# Patient Record
Sex: FEMALE | NOT HISPANIC OR LATINO | Employment: OTHER | URBAN - METROPOLITAN AREA
[De-identification: names, ages, dates, MRNs, and addresses within clinical notes are randomized per-mention and may not be internally consistent; named-entity substitution may affect disease eponyms.]

---

## 2017-02-03 ENCOUNTER — HOSPITAL ENCOUNTER (OUTPATIENT)
Dept: RADIOLOGY | Facility: MEDICAL CENTER | Age: 62
End: 2017-02-03

## 2017-02-06 ENCOUNTER — HOSPITAL ENCOUNTER (OUTPATIENT)
Dept: RADIATION ONCOLOGY | Facility: MEDICAL CENTER | Age: 62
End: 2017-02-28
Attending: RADIOLOGY
Payer: OTHER GOVERNMENT

## 2017-02-06 DIAGNOSIS — C50.511 MALIGNANT NEOPLASM OF LOWER-OUTER QUADRANT OF RIGHT FEMALE BREAST (HCC): ICD-10-CM

## 2017-02-06 DIAGNOSIS — Z01.810 PRE-OPERATIVE CARDIOVASCULAR EXAMINATION: ICD-10-CM

## 2017-02-06 DIAGNOSIS — Z01.811 PRE-OPERATIVE RESPIRATORY EXAMINATION: ICD-10-CM

## 2017-02-06 DIAGNOSIS — Z01.812 PRE-OPERATIVE LABORATORY EXAMINATION: ICD-10-CM

## 2017-02-06 LAB
ANION GAP SERPL CALC-SCNC: 11 MMOL/L (ref 0–11.9)
BUN SERPL-MCNC: 9 MG/DL (ref 8–22)
CALCIUM SERPL-MCNC: 9.2 MG/DL (ref 8.5–10.5)
CHLORIDE SERPL-SCNC: 100 MMOL/L (ref 96–112)
CO2 SERPL-SCNC: 26 MMOL/L (ref 20–33)
CREAT SERPL-MCNC: 0.63 MG/DL (ref 0.5–1.4)
EKG IMPRESSION: NORMAL
ERYTHROCYTE [DISTWIDTH] IN BLOOD BY AUTOMATED COUNT: 40.3 FL (ref 35.9–50)
GFR SERPL CREATININE-BSD FRML MDRD: >60 ML/MIN/1.73 M 2
GLUCOSE SERPL-MCNC: 111 MG/DL (ref 65–99)
HCT VFR BLD AUTO: 43.8 % (ref 37–47)
HGB BLD-MCNC: 14.4 G/DL (ref 12–16)
MCH RBC QN AUTO: 30.1 PG (ref 27–33)
MCHC RBC AUTO-ENTMCNC: 32.9 G/DL (ref 33.6–35)
MCV RBC AUTO: 91.6 FL (ref 81.4–97.8)
PLATELET # BLD AUTO: 327 K/UL (ref 164–446)
PMV BLD AUTO: 10.3 FL (ref 9–12.9)
POTASSIUM SERPL-SCNC: 3.6 MMOL/L (ref 3.6–5.5)
RBC # BLD AUTO: 4.78 M/UL (ref 4.2–5.4)
SODIUM SERPL-SCNC: 137 MMOL/L (ref 135–145)
WBC # BLD AUTO: 8 K/UL (ref 4.8–10.8)

## 2017-02-06 PROCEDURE — 99214 OFFICE O/P EST MOD 30 MIN: CPT | Performed by: RADIOLOGY

## 2017-02-06 PROCEDURE — 99205 OFFICE O/P NEW HI 60 MIN: CPT | Performed by: RADIOLOGY

## 2017-02-06 PROCEDURE — 36415 COLL VENOUS BLD VENIPUNCTURE: CPT

## 2017-02-06 PROCEDURE — 85027 COMPLETE CBC AUTOMATED: CPT

## 2017-02-06 PROCEDURE — 80048 BASIC METABOLIC PNL TOTAL CA: CPT

## 2017-02-06 RX ORDER — CHLORAL HYDRATE 500 MG
1000 CAPSULE ORAL DAILY
COMMUNITY

## 2017-02-07 NOTE — CONSULTS
DATE OF SERVICE:  2017RADIATION ONCOLOGY CONSULTATION    REFERRING PHYSICIAN:  Ramirez Washington MD    OTHER PHYSICIANS:  Cayden Quan MD    Dear Ramirez.    I had the pleasure of seeing the patient today.  As you know, she is a   61-year-old female who was recently diagnosed with what looks to be a T2Nx,   ER/RI positive, HER-2/sierra negative breast cancer, status post biopsy, being   evaluated for SUNDAY.    HISTORY OF PRESENT ILLNESS:  Patient originally presented because of routine   mammogram on 2016 showing a new spiculated mass in the right breast.    Ultrasound on  revealed a 2.2x1.6x1.6 neoplasm in the 8 o'clock   position, highly suspicious for neoplasm.  Needle biopsy 10/01/2010 showed   HER-2 negative, ER/RI positive breast cancer with Ki-67 of 50%.  She is seen   in consultation to know about possibility of a SUNDAY brachytherapy.  She is   scheduled for lumpectomy to be done next week followed by SUNDAY placement the   following week if lymph nodes are negative.  Currently, she is doing well.    She has a little bit of discomfort in the area and just a slight amount of   anxiety, but with the diagnosis, but otherwise is doing well.    ALLERGIES:  PENICILLIN.    MEDICATIONS:  Lisinopril.    PAST MEDICAL HISTORY:  She has a history of gastroesophageal reflux,   hypertension.  She had a uterine cyst that was taking care of by Dr. Quan   in December.  She had a D and C and hysteroscopy.  She has had a lumbar spine   surgery, T and A and tubal ligation.  She is  2, para 1, AB at age 15.    Menarche age 14.  She is postmenopausal.  She has had oral contraceptives for   10 years.    FAMILY HISTORY:  Positive for a half sister who  of breast cancer.    SOCIAL HISTORY:  She smokes e-cigarette, has a 35-pack-year history of smoking   less than a pack a day and started using e-cigarettes in .  She has 6-8   drinks a day.  She has 1 child, lives with her , as a  retired   .    REVIEW OF SYSTEMS:  Significant for urinary frequency, incontinence, stress   incontinence, urgency, vaginal discharge, which is improved since the D and C.    She has bilateral joint pain and weakness in her wrists.  The rest of the   review of systems is negative and is in the EMR.    PHYSICAL EXAMINATION:  GENERAL:  Patient has a KPS of 100.  VITAL SIGNS:  Weight 226, height 69 inches, temperature 98, pulse 97,   respirations 16, blood pressure 161/89, O2 sat 94%.  HEENT:  Normocephalic, atraumatic.  Sclerae are anicteric.  Extraocular   movements are intact.  Ears, nose, mouth, within normal limits.  No   preauricular, neck, supraclavicular, or axillary hansa adenopathy.  HEART:  Regular rate.  LUNGS:  Clear.  ABDOMEN:  Soft, obese without hepatosplenomegaly.  EXTREMITIES:  With clubbing and cyanosis.  No lymphedema.  BREASTS:  On examination of the breast, the right breast in the upper outer   quadrant has a palpable mass in the area of the biopsy, which measures about 2   cm in greatest dimension.  It is mobile and close to the skin.  She has   fibroglandular changes in both breasts otherwise.    IMPRESSION:  A 61-year-old female with a T2Nx, ER/NH positive, HER-2/sierra   negative breast cancer, status post biopsy with Ki-67 of 50%.    RECOMMENDATIONS:  I agree with Dr. Washington that if she has lumpectomy and   sentinel node dissection, if the nodes are negative, she is a candidate for   SUNDAY brachytherapy.  I have discussed the details of the SUNDAY along with the   side effects, both acute and chronic including but not exclusive to   generalized fatigue, damage to the tissues within the treatment field   including local soreness, sunburn type of reaction, delayed healing, possible   fat tissue necrosis, dry skin.  She understands that there is a chance for   infection as well with a device in place for 10 days.  She is very interested   in pursuing this, so we have her  tentatively scheduled for SUNDAY placement on   the 24th and we will also do a pre-CT check to determine the best SUNDAY to use   prior to placement as well.  She also understands that if she is node   positive, she would have to see a medical oncologist about whether or not she   is a candidate for chemotherapy.  Additional studies may have to be done at   that time, but she does understand either way, she will be seeing a medical   oncologist.  She understands that if she does undergo chemotherapy because of   node positivity, then radiation therapy would be followed afterwards and that   would be external in nature and that was also briefly discussed.    Thank you for allowing us to participate in her care.       ____________________________________     MD ANN LEE / PATITO    DD:  02/06/2017 12:12:25  DT:  02/07/2017 04:26:30    D#:  [id number]  Job#:  [id number]

## 2017-02-14 ENCOUNTER — HOSPITAL ENCOUNTER (OUTPATIENT)
Facility: MEDICAL CENTER | Age: 62
End: 2017-02-14
Attending: SURGERY | Admitting: SURGERY
Payer: OTHER GOVERNMENT

## 2017-02-14 ENCOUNTER — APPOINTMENT (OUTPATIENT)
Dept: RADIOLOGY | Facility: MEDICAL CENTER | Age: 62
End: 2017-02-14
Attending: SURGERY
Payer: OTHER GOVERNMENT

## 2017-02-14 VITALS
SYSTOLIC BLOOD PRESSURE: 145 MMHG | DIASTOLIC BLOOD PRESSURE: 73 MMHG | HEART RATE: 71 BPM | WEIGHT: 225.53 LBS | OXYGEN SATURATION: 94 % | RESPIRATION RATE: 16 BRPM | BODY MASS INDEX: 33.4 KG/M2 | HEIGHT: 69 IN | TEMPERATURE: 98.9 F

## 2017-02-14 DIAGNOSIS — C50.511 MALIGNANT NEOPLASM OF LOWER-OUTER QUADRANT OF RIGHT FEMALE BREAST (HCC): ICD-10-CM

## 2017-02-14 PROCEDURE — A4606 OXYGEN PROBE USED W OXIMETER: HCPCS | Performed by: SURGERY

## 2017-02-14 PROCEDURE — 160002 HCHG RECOVERY MINUTES (STAT): Performed by: SURGERY

## 2017-02-14 PROCEDURE — 700111 HCHG RX REV CODE 636 W/ 250 OVERRIDE (IP)

## 2017-02-14 PROCEDURE — 160046 HCHG PACU - 1ST 60 MINS PHASE II: Performed by: SURGERY

## 2017-02-14 PROCEDURE — 502647 HCHG SEALANT-FIBRIN EVICEL 5 ML: Performed by: SURGERY

## 2017-02-14 PROCEDURE — 88307 TISSUE EXAM BY PATHOLOGIST: CPT | Mod: 59

## 2017-02-14 PROCEDURE — 160041 HCHG SURGERY MINUTES - EA ADDL 1 MIN LEVEL 4: Performed by: SURGERY

## 2017-02-14 PROCEDURE — 160047 HCHG PACU  - EA ADDL 30 MINS PHASE II: Performed by: SURGERY

## 2017-02-14 PROCEDURE — 160009 HCHG ANES TIME/MIN: Performed by: SURGERY

## 2017-02-14 PROCEDURE — 502594 HCHG SCISSOR HANDLE, HARMONIC ACE: Performed by: SURGERY

## 2017-02-14 PROCEDURE — 500448 HCHG DRESSING, TELFA 3X4: Performed by: SURGERY

## 2017-02-14 PROCEDURE — 500887 HCHG PACK, MAJOR BASIN: Performed by: SURGERY

## 2017-02-14 PROCEDURE — 700101 HCHG RX REV CODE 250

## 2017-02-14 PROCEDURE — 110382 HCHG SHELL REV 271: Performed by: SURGERY

## 2017-02-14 PROCEDURE — 160029 HCHG SURGERY MINUTES - 1ST 30 MINS LEVEL 4: Performed by: SURGERY

## 2017-02-14 PROCEDURE — 88331 PATH CONSLTJ SURG 1 BLK 1SPC: CPT | Mod: 91

## 2017-02-14 PROCEDURE — 160025 RECOVERY II MINUTES (STATS): Performed by: SURGERY

## 2017-02-14 PROCEDURE — 160035 HCHG PACU - 1ST 60 MINS PHASE I: Performed by: SURGERY

## 2017-02-14 PROCEDURE — 501838 HCHG SUTURE GENERAL: Performed by: SURGERY

## 2017-02-14 PROCEDURE — 502240 HCHG MISC OR SUPPLY RC 0272: Performed by: SURGERY

## 2017-02-14 PROCEDURE — 700102 HCHG RX REV CODE 250 W/ 637 OVERRIDE(OP)

## 2017-02-14 PROCEDURE — A6402 STERILE GAUZE <= 16 SQ IN: HCPCS | Performed by: SURGERY

## 2017-02-14 PROCEDURE — 700111 HCHG RX REV CODE 636 W/ 250 OVERRIDE (IP): Performed by: SURGERY

## 2017-02-14 PROCEDURE — 38792 RA TRACER ID OF SENTINL NODE: CPT | Mod: RT

## 2017-02-14 PROCEDURE — 88305 TISSUE EXAM BY PATHOLOGIST: CPT

## 2017-02-14 PROCEDURE — 110371 HCHG SHELL REV 272: Performed by: SURGERY

## 2017-02-14 PROCEDURE — A9270 NON-COVERED ITEM OR SERVICE: HCPCS

## 2017-02-14 PROCEDURE — 501445 HCHG STAPLER, SKIN DISP: Performed by: SURGERY

## 2017-02-14 PROCEDURE — 160048 HCHG OR STATISTICAL LEVEL 1-5: Performed by: SURGERY

## 2017-02-14 RX ORDER — ONDANSETRON 2 MG/ML
4 INJECTION INTRAMUSCULAR; INTRAVENOUS
Status: DISCONTINUED | OUTPATIENT
Start: 2017-02-14 | End: 2017-02-14 | Stop reason: HOSPADM

## 2017-02-14 RX ORDER — BUPIVACAINE HYDROCHLORIDE AND EPINEPHRINE 5; 5 MG/ML; UG/ML
INJECTION, SOLUTION EPIDURAL; INTRACAUDAL; PERINEURAL
Status: DISCONTINUED | OUTPATIENT
Start: 2017-02-14 | End: 2017-02-14 | Stop reason: HOSPADM

## 2017-02-14 RX ORDER — LIDOCAINE AND PRILOCAINE 25; 25 MG/G; MG/G
CREAM TOPICAL
Status: COMPLETED
Start: 2017-02-14 | End: 2017-02-14

## 2017-02-14 RX ORDER — SODIUM CHLORIDE, SODIUM LACTATE, POTASSIUM CHLORIDE, CALCIUM CHLORIDE 600; 310; 30; 20 MG/100ML; MG/100ML; MG/100ML; MG/100ML
1000 INJECTION, SOLUTION INTRAVENOUS
Status: COMPLETED | OUTPATIENT
Start: 2017-02-14 | End: 2017-02-14

## 2017-02-14 RX ORDER — MORPHINE SULFATE 4 MG/ML
1-4 INJECTION, SOLUTION INTRAMUSCULAR; INTRAVENOUS
Status: DISCONTINUED | OUTPATIENT
Start: 2017-02-14 | End: 2017-02-14 | Stop reason: HOSPADM

## 2017-02-14 RX ORDER — LIDOCAINE HYDROCHLORIDE 10 MG/ML
INJECTION, SOLUTION INFILTRATION; PERINEURAL
Status: COMPLETED
Start: 2017-02-14 | End: 2017-02-14

## 2017-02-14 RX ORDER — ISOSULFAN BLUE 50 MG/5ML
5 INJECTION, SOLUTION SUBCUTANEOUS ONCE
Status: DISCONTINUED | OUTPATIENT
Start: 2017-02-14 | End: 2017-02-14 | Stop reason: HOSPADM

## 2017-02-14 RX ORDER — OXYCODONE HCL 5 MG/5 ML
SOLUTION, ORAL ORAL
Status: COMPLETED
Start: 2017-02-14 | End: 2017-02-14

## 2017-02-14 RX ORDER — ALPRAZOLAM 0.25 MG/1
TABLET ORAL
Status: COMPLETED
Start: 2017-02-14 | End: 2017-02-14

## 2017-02-14 RX ORDER — OXYCODONE HYDROCHLORIDE AND ACETAMINOPHEN 5; 325 MG/1; MG/1
1-2 TABLET ORAL EVERY 4 HOURS PRN
Status: DISCONTINUED | OUTPATIENT
Start: 2017-02-14 | End: 2017-02-14 | Stop reason: HOSPADM

## 2017-02-14 RX ADMIN — ALPRAZOLAM 0.5 MG: 0.25 TABLET ORAL at 09:35

## 2017-02-14 RX ADMIN — LIDOCAINE HYDROCHLORIDE 0.1 ML: 10 INJECTION, SOLUTION INFILTRATION; PERINEURAL at 09:39

## 2017-02-14 RX ADMIN — SODIUM CHLORIDE, SODIUM LACTATE, POTASSIUM CHLORIDE, CALCIUM CHLORIDE 1000 ML: 600; 310; 30; 20 INJECTION, SOLUTION INTRAVENOUS at 09:41

## 2017-02-14 RX ADMIN — OXYCODONE HYDROCHLORIDE 10 MG: 5 SOLUTION ORAL at 14:30

## 2017-02-14 RX ADMIN — LIDOCAINE AND PRILOCAINE 1 APPLICATION: 25; 25 CREAM TOPICAL at 09:06

## 2017-02-14 ASSESSMENT — PAIN SCALES - GENERAL
PAINLEVEL_OUTOF10: 3
PAINLEVEL_OUTOF10: 3
PAINLEVEL_OUTOF10: 0
PAINLEVEL_OUTOF10: 3
PAINLEVEL_OUTOF10: 1
PAINLEVEL_OUTOF10: 2
PAINLEVEL_OUTOF10: 1
PAINLEVEL_OUTOF10: 2

## 2017-02-14 NOTE — IP AVS SNAPSHOT
" Home Care Instructions                                                                                                                Name:Juliane Vega  Medical Record Number:[id number]  CSN: [id number]    YOB: 1955   Age: 62 y.o.  Sex: female  HT:1.753 m (5' 9\") WT: 102.3 kg (225 lb 8.5 oz)          Admit Date: 2/14/2017     Discharge Date:   Today's Date: 2/14/2017  Attending Doctor:  Ramirez Washington M.D.                  Allergies:  Penicillins                Discharge Instructions         ACTIVITY: Rest and take it easy for the first 24 hours.  A responsible adult is recommended to remain with you during that time.  It is normal to feel sleepy.  We encourage you to not do anything that requires balance, judgment or coordination.    MILD FLU-LIKE SYMPTOMS ARE NORMAL. YOU MAY EXPERIENCE GENERALIZED MUSCLE ACHES, THROAT IRRITATION, HEADACHE AND/OR SOME NAUSEA.    FOR 24 HOURS DO NOT:  Drive, operate machinery or run household appliances.  Drink beer or alcoholic beverages.   Make important decisions or sign legal documents.    SPECIAL INSTRUCTIONS:   Partial Mastectomy With Axillary Lymph Node Dissection, Care After  Refer to this sheet in the next few weeks. These instructions provide you with information about caring for yourself after your procedure. Your health care provider may also give you more specific instructions. Your treatment has been planned according to current medical practices, but problems sometimes occur. Call your health care provider if you have any problems or questions after your procedure.  WHAT TO EXPECT AFTER THE PROCEDURE  After your procedure, it is common to have:   · Breast swelling.  · Breast tenderness.  · Stiffness in your arm or shoulder.  · A change in the shape and feel of your breast.  HOME CARE INSTRUCTIONS  Bathing  · Take sponge baths until your health care provider says that you can start showering or bathing.  · Do not take baths, swim, or use a hot tub " until your health care provider approves.  Incision Care  · There are many different ways to close and cover an incision, including stitches, skin glue, and adhesive strips. Follow your health care provider's instructions about:  ¨ Incision care.  ¨ Bandage (dressing) changes and removal.  ¨ Incision closure removal.  · Check your incision area every day for signs of infection. Watch for:  ¨ Redness, swelling, or pain.  ¨ Fluid, blood, or pus.  · If you were sent home with a surgical drain in place, follow your health care provider's instructions for emptying it.  Activities  · Return to your normal activities as directed by your health care provider.  · Avoid strenuous exercise.  · Be careful to avoid any activities that could cause an injury to your arm on the side of your surgery.  · Do not lift anything that is heavier than 10 lb (4.5 kg). Avoid lifting with the arm that is on the side of your surgery.  · Do not carry heavy objects on your shoulder.  · After your drain is removed, you should perform exercises to keep your arm from getting stiff and swollen. Talk with your health care provider about which exercises are safe for you.  General Instructions  · Take medicines only as directed by your health care provider.  · Keep your dressing clean and dry.  · You may eat what you usually do.  · Wear a supportive bra as directed by your health care provider.  · Keep your arm elevated when at rest.  · Do not wear tight jewelry on your arm, wrist, or fingers on the side of your surgery.  · Always let your health care providers know that you have had a lymph node dissection in your arm. This is important information to share before you are involved in certain procedures, such as giving blood or having your blood pressure taken.  SEEK MEDICAL CARE IF:  · You have a fever.  · Your pain medicine is not working.  · Your swelling, weakness, or numbness in your arm has not improved after a few weeks.  · You have new swelling  in your breast or arm.  · You have redness, swelling, or pain in your incision area.  · You have fluid, blood, or pus coming from your incision.  SEEK IMMEDIATE MEDICAL CARE IF:  · You have very bad pain in your breast or arm.  · You have chest pain.  · You have difficulty breathing.     This information is not intended to replace advice given to you by your health care provider. Make sure you discuss any questions you have with your health care provider.     DIET: To avoid nausea, slowly advance diet as tolerated, avoiding spicy or greasy foods for the first day.  Add more substantial food to your diet according to your physician's instructions. INCREASE FLUIDS AND FIBER TO AVOID CONSTIPATION.    SURGICAL DRESSING/BATHING: Follow instructions given to you by your surgeon    FOLLOW-UP APPOINTMENT:  A follow-up appointment should be arranged with your doctor; call to schedule.    You should CALL YOUR PHYSICIAN if you develop:  Fever greater than 101 degrees F.  Pain not relieved by medication, or persistent nausea or vomiting.  Excessive bleeding (blood soaking through dressing) or unexpected drainage from the wound.  Extreme redness or swelling around the incision site, drainage of pus or foul smelling drainage.  Inability to urinate or empty your bladder within 8 hours.  Problems with breathing or chest pain.    You should call 911 if you develop problems with breathing or chest pain.  If you are unable to contact your doctor or surgical center, you should go to the nearest emergency room or urgent care center.    Physician's telephone #: Dr. Washington [phone]     If any questions arise, call your doctor.  If your doctor is not available, please feel free to call the Surgical Center at [phone].  The Center is open Monday through Friday from 7AM to 7PM.  You can also call the HEALTH HOTLINE open 24 hours/day, 7 days/week and speak to a nurse at [phone], or toll free at [phone].    A registered  nurse may call you a few days after your surgery to see how you are doing after your procedure.    MEDICATIONS: Resume taking daily medication.  Take prescribed pain medication with food.  If no medication is prescribed, you may take non-aspirin pain medication if needed.  PAIN MEDICATION CAN BE VERY CONSTIPATING.  Take a stool softener or laxative such as senokot, pericolace, or milk of magnesia if needed.    Prescription given for Oxycodone.  Last pain medication given at 2:30 pm.    If your physician has prescribed pain medication that includes Acetaminophen (Tylenol), do not take additional Acetaminophen (Tylenol) while taking the prescribed medication.    Depression / Suicide Risk    As you are discharged from this [facility] facility, it is important to learn how to keep safe from harming yourself.    Recognize the warning signs:  · Abrupt changes in personality, positive or negative- including increase in energy   · Giving away possessions  · Change in eating patterns- significant weight changes-  positive or negative  · Change in sleeping patterns- unable to sleep or sleeping all the time   · Unwillingness or inability to communicate  · Depression  · Unusual sadness, discouragement and loneliness  · Talk of wanting to die  · Neglect of personal appearance   · Rebelliousness- reckless behavior  · Withdrawal from people/activities they love  · Confusion- inability to concentrate     If you or a loved one observes any of these behaviors or has concerns about self-harm, here's what you can do:  · Talk about it- your feelings and reasons for harming yourself  · Remove any means that you might use to hurt yourself (examples: pills, rope, extension cords, firearm)  · Get professional help from the community (Mental Health, Substance Abuse, psychological counseling)  · Do not be alone:Call your Safe Contact- someone whom you trust who will be there for you.  · Call your local CRISIS HOTLINE 894-5790 or  601.457.7412  · Call your local Children's Mobile Crisis Response Team [place] (503) 830-3110 or www.[employer]Pack  · Call the toll free National Suicide Prevention Hotlines   · National Suicide Prevention Lifeline 244-822-SPKA (3774)  · National Hope Line Network 800-SUICIDE (231-1842)       Medication List      ASK your doctor about these medications        Instructions    benazepril-hydrochlorthiazide 20-25 MG per tablet   Commonly known as:  LOTENSIN HCT    TAKE 1 TAB BY MOUTH EVERY DAY.       CALCIUM PLUS VITAMIN D PO    Take 1 Tab by mouth every day.   Dose:  1 Tab       CENTRUM SILVER PO    Take 1 Tab by mouth every day.   Dose:  1 Tab       CO Q 10 PO    Take 200 mg by mouth every day.   Dose:  200 mg       fish oil 1000 MG Caps capsule    Take 1,000 mg by mouth every day.   Dose:  1000 mg       FLAX SEED OIL PO    Take 1,000 mg by mouth every day.   Dose:  1000 mg               Medication Information     Above and/or attached are the medications your physician expects you to take upon discharge. Review all of your home medications and newly ordered medications with your doctor and/or pharmacist. Follow medication instructions as directed by your doctor and/or pharmacist. Please keep your medication list with you and share with your physician. Update the information when medications are discontinued, doses are changed, or new medications (including over-the-counter products) are added; and carry medication information at all times in the event of emergency situations.        Resources     Quit Smoking / Tobacco Use:    I understand the use of any tobacco products increases my chance of suffering from future heart disease or stroke and could cause other illnesses which may shorten my life. Quitting the use of tobacco products is the single most important thing I can do to improve my health. For further information on smoking / tobacco cessation call a Toll Free Quit Line at 1-953.417.5156 (*National  Cancer [place]) or 1-782.536.2671 (American Lung Association) or you can access the web based program at www.lungusa.org.    [place] Tobacco Users Help Line:  (752) 230-6088       Toll Free: 0-317-854-4199  Quit Tobacco Program [facility] (219)676-1493    Crisis Hotline:    [place] Crisis Hotline:  8-430-DRCXCNZ or 1-872.972.6074    [place] Crisis Hotline:    1-838.877.8980 or 709-357-7593    Discharge Survey:   Thank you for choosing [facility]. We hope we did everything we could to make your hospital stay a pleasant one. You may be receiving a survey and we would appreciate your time and participation in answering the questions. Your input is very valuable to us in our efforts to improve our service to our patients and their families.            Signatures     My signature on this form indicates that:    1. I acknowledge receipt and understanding of these Home Care Instruction.  2. My questions regarding this information have been answered to my satisfaction.  3. I have formulated a plan with my discharge nurse to obtain my prescribed medications for home.    __________________________________      __________________________________                   Patient Signature                                 Guardian/Responsible Adult Signature      __________________________________                 __________       ________                       Nurse Signature                                               Date                 Time

## 2017-02-14 NOTE — OR SURGEON
Immediate Post-Operative Note      PreOp Diagnosis: right BC    PostOp Diagnosis: same    Procedure(s):  MASTECTOMY - PARTIAL - Wound Class: Clean  NODE BIOPSY SENTINEL - Wound Class: Clean  AXILLARY NODE DISSECTION - Wound Class: Clean    Surgeon(s):  AMARILIS Mitchell M.D.    Anesthesiologist/Type of Anesthesia:  Anesthesiologist: Ramirez Mclaughlin M.D./General    Surgical Staff:  Circulator: Ramirez Torres R.N.  Scrub Person: Phill Saldivar    Specimen: 4    Estimated Blood Loss: 100    Findings: neg SLN    Complications: 0        2/14/2017 2:36 PM Ramirez Washington

## 2017-02-14 NOTE — DISCHARGE INSTRUCTIONS
ACTIVITY: Rest and take it easy for the first 24 hours.  A responsible adult is recommended to remain with you during that time.  It is normal to feel sleepy.  We encourage you to not do anything that requires balance, judgment or coordination.    MILD FLU-LIKE SYMPTOMS ARE NORMAL. YOU MAY EXPERIENCE GENERALIZED MUSCLE ACHES, THROAT IRRITATION, HEADACHE AND/OR SOME NAUSEA.    FOR 24 HOURS DO NOT:  Drive, operate machinery or run household appliances.  Drink beer or alcoholic beverages.   Make important decisions or sign legal documents.    SPECIAL INSTRUCTIONS:   Partial Mastectomy With Axillary Lymph Node Dissection, Care After  Refer to this sheet in the next few weeks. These instructions provide you with information about caring for yourself after your procedure. Your health care provider may also give you more specific instructions. Your treatment has been planned according to current medical practices, but problems sometimes occur. Call your health care provider if you have any problems or questions after your procedure.  WHAT TO EXPECT AFTER THE PROCEDURE  After your procedure, it is common to have:   · Breast swelling.  · Breast tenderness.  · Stiffness in your arm or shoulder.  · A change in the shape and feel of your breast.  HOME CARE INSTRUCTIONS  Bathing  · Take sponge baths until your health care provider says that you can start showering or bathing.  · Do not take baths, swim, or use a hot tub until your health care provider approves.  Incision Care  · There are many different ways to close and cover an incision, including stitches, skin glue, and adhesive strips. Follow your health care provider's instructions about:  ¨ Incision care.  ¨ Bandage (dressing) changes and removal.  ¨ Incision closure removal.  · Check your incision area every day for signs of infection. Watch for:  ¨ Redness, swelling, or pain.  ¨ Fluid, blood, or pus.  · If you were sent home with a surgical drain in place, follow your  health care provider's instructions for emptying it.  Activities  · Return to your normal activities as directed by your health care provider.  · Avoid strenuous exercise.  · Be careful to avoid any activities that could cause an injury to your arm on the side of your surgery.  · Do not lift anything that is heavier than 10 lb (4.5 kg). Avoid lifting with the arm that is on the side of your surgery.  · Do not carry heavy objects on your shoulder.  · After your drain is removed, you should perform exercises to keep your arm from getting stiff and swollen. Talk with your health care provider about which exercises are safe for you.  General Instructions  · Take medicines only as directed by your health care provider.  · Keep your dressing clean and dry.  · You may eat what you usually do.  · Wear a supportive bra as directed by your health care provider.  · Keep your arm elevated when at rest.  · Do not wear tight jewelry on your arm, wrist, or fingers on the side of your surgery.  · Always let your health care providers know that you have had a lymph node dissection in your arm. This is important information to share before you are involved in certain procedures, such as giving blood or having your blood pressure taken.  SEEK MEDICAL CARE IF:  · You have a fever.  · Your pain medicine is not working.  · Your swelling, weakness, or numbness in your arm has not improved after a few weeks.  · You have new swelling in your breast or arm.  · You have redness, swelling, or pain in your incision area.  · You have fluid, blood, or pus coming from your incision.  SEEK IMMEDIATE MEDICAL CARE IF:  · You have very bad pain in your breast or arm.  · You have chest pain.  · You have difficulty breathing.     This information is not intended to replace advice given to you by your health care provider. Make sure you discuss any questions you have with your health care provider.     DIET: To avoid nausea, slowly advance diet as  tolerated, avoiding spicy or greasy foods for the first day.  Add more substantial food to your diet according to your physician's instructions. INCREASE FLUIDS AND FIBER TO AVOID CONSTIPATION.    SURGICAL DRESSING/BATHING: Follow instructions given to you by your surgeon    FOLLOW-UP APPOINTMENT:  A follow-up appointment should be arranged with your doctor; call to schedule.    You should CALL YOUR PHYSICIAN if you develop:  Fever greater than 101 degrees F.  Pain not relieved by medication, or persistent nausea or vomiting.  Excessive bleeding (blood soaking through dressing) or unexpected drainage from the wound.  Extreme redness or swelling around the incision site, drainage of pus or foul smelling drainage.  Inability to urinate or empty your bladder within 8 hours.  Problems with breathing or chest pain.    You should call 911 if you develop problems with breathing or chest pain.  If you are unable to contact your doctor or surgical center, you should go to the nearest emergency room or urgent care center.    Physician's telephone #: Dr. Washington [phone]     If any questions arise, call your doctor.  If your doctor is not available, please feel free to call the Surgical Center at [phone].  The Center is open Monday through Friday from 7AM to 7PM.  You can also call the [facility] HOTLINE open 24 hours/day, 7 days/week and speak to a nurse at [phone], or toll free at [phone].    A registered nurse may call you a few days after your surgery to see how you are doing after your procedure.    MEDICATIONS: Resume taking daily medication.  Take prescribed pain medication with food.  If no medication is prescribed, you may take non-aspirin pain medication if needed.  PAIN MEDICATION CAN BE VERY CONSTIPATING.  Take a stool softener or laxative such as senokot, pericolace, or milk of magnesia if needed.    Prescription given for Oxycodone.  Last pain medication given at 2:30 pm.    If your physician  has prescribed pain medication that includes Acetaminophen (Tylenol), do not take additional Acetaminophen (Tylenol) while taking the prescribed medication.    Depression / Suicide Risk    As you are discharged from this [facility] facility, it is important to learn how to keep safe from harming yourself.    Recognize the warning signs:  · Abrupt changes in personality, positive or negative- including increase in energy   · Giving away possessions  · Change in eating patterns- significant weight changes-  positive or negative  · Change in sleeping patterns- unable to sleep or sleeping all the time   · Unwillingness or inability to communicate  · Depression  · Unusual sadness, discouragement and loneliness  · Talk of wanting to die  · Neglect of personal appearance   · Rebelliousness- reckless behavior  · Withdrawal from people/activities they love  · Confusion- inability to concentrate     If you or a loved one observes any of these behaviors or has concerns about self-harm, here's what you can do:  · Talk about it- your feelings and reasons for harming yourself  · Remove any means that you might use to hurt yourself (examples: pills, rope, extension cords, firearm)  · Get professional help from the community (Mental Health, Substance Abuse, psychological counseling)  · Do not be alone:Call your Safe Contact- someone whom you trust who will be there for you.  · Call your local CRISIS HOTLINE 907-6797 or 422-903-4087  · Call your local Children's Mobile Crisis Response Team [place] (313) 461-9005 or www.Biomeasure  · Call the toll free National Suicide Prevention Hotlines   · National Suicide Prevention Lifeline 992-222-WHAD (3516)  · National Hope Line Network 800-SUICIDE (655-4875)

## 2017-02-14 NOTE — IP AVS SNAPSHOT
2/14/2017          Juliane Vega  [address] [address]    Dear Juliane:    [facility] wants to ensure your discharge home is safe and you or your loved ones have had all your questions answered regarding your care after you leave the hospital.    You may receive a telephone call within two days of your discharge.  This call is to make certain you understand your discharge instructions as well as ensure we provided you with the best care possible during your stay with us.     The call will only last approximately 3-5 minutes and will be done by a nurse.    Once again, we want to ensure your discharge home is safe and that you have a clear understanding of any next steps in your care.  If you have any questions or concerns, please do not hesitate to contact us, we are here for you.  Thank you for choosing [facility] for your healthcare needs.    Sincerely,    Matthew Lindo    [facility]

## 2017-02-15 NOTE — OP REPORT
DATE OF SERVICE:  02/14/2017    PREOPERATIVE DIAGNOSIS:  Carcinoma of the right breast.    POSTOPERATIVE DIAGNOSIS:  Carcinoma of the right breast.    OPERATION PERFORMED:  1.  Ultrasound localized and guided right partial mastectomy with ultrasound   evaluation of the specimen for margins.  2.  Right axillary sentinel lymph node identification and biopsy.  3.  Right axillary lymph node dissection.    SURGEON:  Ramirez Washington MD    ANESTHESIA:  Ramirez Mclaughlin MD    ASSISTANT:  Dieter Hicks MD    OPERATIVE NOTE:  The patient presents with carcinoma of the right breast.  She   had an abnormal mammogram.  She does have a palpable mass as well.  She was   clinically stage II based on tumor size.  The mass was felt to be greater than   3 cm in maximum diameter.  The patient was felt to be a candidate for   conservative therapy.  The risks, possible complications, alternatives of   approach were discussed with her in detail.  She _____ for partial breast   radiation if she were node negative.  She did consent to a sentinel lymph node   biopsy, possible axillary lymph node dissection and underwent preoperative   injection with technetium sulfur colloid for identification of sentinel lymph   node.  She had a satisfactory preoperative evaluation, was taken to the   operating room and placed under anesthesia by Dr. Mclaughlin.  Her right breast   was prepped with ChloraPrep solution, sterile drapes were applied.  A time-out   was affected.  The axilla was interrogated with a navigator, it likely   contained a sentinel lymph node on basis radiation alone.  The patient had a   solution of 0.5% Marcaine with epinephrine infiltrated in the skin and   subcutaneous tissues.  Incision was made below the hair bearing area and   carried down through skin, subcutaneous tissues to the level of the axillary   fascia, which was divided.  The deep axillary space was interrogated with the   navigator localization of sentinel lymph node was  identified.  These were   elevated and excised with harmonic abhijit.  The patient had this tissue   dissected in situ and sentinel lymph nodes were identified and submitted to   pathology for evaluation.  The patient had the additional lymph node tissues   sent for pathology.  The patient had an additional sentinel lymph node   identified in the axilla, but it was not as radioactive as the first 2 and it   was submitted along for permanent section.  The patient had the additional   tissue submitted as axillary lymph node dissection and the patient had a wound   puddled with Marcaine.  Attention was turned to the breast.  Here, the tumor   was identified by ultrasound.  It was carefully demarcated on the skin and an   ellipse was made in the lower outer quadrant on the skin to incorporate the   mass.  The patient then had a partial mastectomy performed full thickness to   the chest wall using countertraction electrocautery.  Intraoperatively, the   margins were continuously evaluated with ultrasound with repetitive analysis   in order to ensure that margins were obtained at least anatomically and this   was achieved.  The specimen was marked short superior, long lateral, deep   purple and submitted to pathology for permanent section.  The wound was made   hemostatic using electrocautery.  We considered _____ the sentinel lymph nodes   came back positive, they came back negative.  The patient had the breasts   closed using interrupted 3-0 Vicryl suture to modify the cavity for partial   breast radiation and it was closed in subcutaneous space using 3-0 Vicryl   suture, the skin was closed using running 4-0 Monocryl, then Steri-stripped.    Additional Marcaine was placed in the wound.  The patient had the axillary   wound treated with 5 mL of Evicel, the deep tissues in the subcutaneous   tissues were closed in 2 layers using interrupted 3-0 Vicryl suture and the   skin was closed using running 4-0 Monocryl and  Steri-Strips were applied with   a sterile dressing.  The procedure was completed.  The patient was awakened,   extubated and taken to recovery room in stable and satisfactory condition.    Sponges, needle counts were reported as correct.  There were no intraoperative   complications.  The patient's pathology is now pending.  She was given   prescriptions for oxycodone IR 5 mg #30.  She will return to see me in the   office next week, possibly for partial breast irradiation device placement   depending on final pathology.       ____________________________________     MD BREANA BUENO / NTS    DD:  02/14/2017 14:42:26  DT:  02/14/2017 18:06:08    D#:  [id number]  Job#:  [id number]    cc: JAYCE CASEY MD, Dieter Hicks MD

## 2017-02-23 PROCEDURE — 77290 THER RAD SIMULAJ FIELD CPLX: CPT | Performed by: RADIOLOGY

## 2017-02-23 PROCEDURE — 77334 RADIATION TREATMENT AID(S): CPT | Performed by: RADIOLOGY

## 2017-02-23 PROCEDURE — 77470 SPECIAL RADIATION TREATMENT: CPT | Performed by: RADIOLOGY

## 2017-02-27 ENCOUNTER — HOSPITAL ENCOUNTER (OUTPATIENT)
Dept: RADIATION ONCOLOGY | Facility: MEDICAL CENTER | Age: 62
End: 2017-02-27

## 2017-02-27 PROCEDURE — 77295 3-D RADIOTHERAPY PLAN: CPT | Mod: 26 | Performed by: RADIOLOGY

## 2017-02-27 PROCEDURE — 77771 HDR RDNCL NTRSTL/ICAV BRCHTX: CPT | Performed by: RADIOLOGY

## 2017-02-27 PROCEDURE — C1717 BRACHYTX, NON-STR,HDR IR-192: HCPCS | Performed by: RADIOLOGY

## 2017-02-27 PROCEDURE — 77280 THER RAD SIMULAJ FIELD SMPL: CPT | Performed by: RADIOLOGY

## 2017-02-27 PROCEDURE — 77370 RADIATION PHYSICS CONSULT: CPT | Performed by: RADIOLOGY

## 2017-02-27 PROCEDURE — 77300 RADIATION THERAPY DOSE PLAN: CPT | Performed by: RADIOLOGY

## 2017-02-27 PROCEDURE — 77771 HDR RDNCL NTRSTL/ICAV BRCHTX: CPT | Mod: 26,XE | Performed by: RADIOLOGY

## 2017-02-27 PROCEDURE — 77295 3-D RADIOTHERAPY PLAN: CPT | Performed by: RADIOLOGY

## 2017-02-28 ENCOUNTER — HOSPITAL ENCOUNTER (OUTPATIENT)
Dept: RADIATION ONCOLOGY | Facility: MEDICAL CENTER | Age: 62
End: 2017-02-28

## 2017-02-28 PROCEDURE — 77336 RADIATION PHYSICS CONSULT: CPT | Mod: XU | Performed by: RADIOLOGY

## 2017-02-28 PROCEDURE — C1717 BRACHYTX, NON-STR,HDR IR-192: HCPCS | Performed by: RADIOLOGY

## 2017-02-28 PROCEDURE — 77771 HDR RDNCL NTRSTL/ICAV BRCHTX: CPT | Performed by: RADIOLOGY

## 2017-02-28 PROCEDURE — 77771 HDR RDNCL NTRSTL/ICAV BRCHTX: CPT | Mod: 26,XE | Performed by: RADIOLOGY

## 2017-02-28 PROCEDURE — 77280 THER RAD SIMULAJ FIELD SMPL: CPT | Mod: 26,XE | Performed by: RADIOLOGY

## 2017-02-28 PROCEDURE — 77280 THER RAD SIMULAJ FIELD SMPL: CPT | Performed by: RADIOLOGY

## 2017-03-01 ENCOUNTER — HOSPITAL ENCOUNTER (OUTPATIENT)
Dept: RADIATION ONCOLOGY | Facility: MEDICAL CENTER | Age: 62
End: 2017-03-01

## 2017-03-01 ENCOUNTER — HOSPITAL ENCOUNTER (OUTPATIENT)
Dept: RADIATION ONCOLOGY | Facility: MEDICAL CENTER | Age: 62
End: 2017-03-31
Attending: RADIOLOGY
Payer: OTHER GOVERNMENT

## 2017-03-01 PROCEDURE — 77771 HDR RDNCL NTRSTL/ICAV BRCHTX: CPT | Mod: 26 | Performed by: RADIOLOGY

## 2017-03-01 PROCEDURE — 77280 THER RAD SIMULAJ FIELD SMPL: CPT | Performed by: RADIOLOGY

## 2017-03-01 PROCEDURE — C1717 BRACHYTX, NON-STR,HDR IR-192: HCPCS | Performed by: RADIOLOGY

## 2017-03-01 PROCEDURE — 77280 THER RAD SIMULAJ FIELD SMPL: CPT | Mod: 26 | Performed by: RADIOLOGY

## 2017-03-01 PROCEDURE — 77771 HDR RDNCL NTRSTL/ICAV BRCHTX: CPT | Performed by: RADIOLOGY

## 2017-03-02 ENCOUNTER — DOCUMENTATION (OUTPATIENT)
Dept: OTHER | Facility: MEDICAL CENTER | Age: 62
End: 2017-03-02

## 2017-03-02 ENCOUNTER — HOSPITAL ENCOUNTER (OUTPATIENT)
Dept: RADIATION ONCOLOGY | Facility: MEDICAL CENTER | Age: 62
End: 2017-03-02

## 2017-03-02 PROCEDURE — 77771 HDR RDNCL NTRSTL/ICAV BRCHTX: CPT | Performed by: RADIOLOGY

## 2017-03-02 PROCEDURE — 77280 THER RAD SIMULAJ FIELD SMPL: CPT | Performed by: RADIOLOGY

## 2017-03-02 PROCEDURE — 77771 HDR RDNCL NTRSTL/ICAV BRCHTX: CPT | Mod: 26,XE | Performed by: RADIOLOGY

## 2017-03-02 PROCEDURE — 77280 THER RAD SIMULAJ FIELD SMPL: CPT | Mod: 26,XE | Performed by: RADIOLOGY

## 2017-03-02 PROCEDURE — C1717 BRACHYTX, NON-STR,HDR IR-192: HCPCS | Performed by: RADIOLOGY

## 2017-03-02 NOTE — PROGRESS NOTES
Chart reviewed.  Lumpectomy complete.  Undergoing APBI with Dr. Mcgraw.  Appt scheduled to see Dr. Palafox for medical oncology needs.

## 2017-03-03 PROCEDURE — 77280 THER RAD SIMULAJ FIELD SMPL: CPT | Performed by: RADIOLOGY

## 2017-03-03 PROCEDURE — C1717 BRACHYTX, NON-STR,HDR IR-192: HCPCS | Performed by: RADIOLOGY

## 2017-03-03 PROCEDURE — 77771 HDR RDNCL NTRSTL/ICAV BRCHTX: CPT | Mod: 26,XE | Performed by: RADIOLOGY

## 2017-03-03 PROCEDURE — 77336 RADIATION PHYSICS CONSULT: CPT | Mod: XU | Performed by: RADIOLOGY

## 2017-03-03 PROCEDURE — 77771 HDR RDNCL NTRSTL/ICAV BRCHTX: CPT | Performed by: RADIOLOGY

## 2017-03-03 PROCEDURE — 77280 THER RAD SIMULAJ FIELD SMPL: CPT | Mod: 26,XE | Performed by: RADIOLOGY

## 2017-03-08 ENCOUNTER — HOSPITAL ENCOUNTER (OUTPATIENT)
Dept: LAB | Facility: MEDICAL CENTER | Age: 62
End: 2017-03-08
Attending: INTERNAL MEDICINE
Payer: OTHER GOVERNMENT

## 2017-03-08 ENCOUNTER — OFFICE VISIT (OUTPATIENT)
Dept: HEMATOLOGY ONCOLOGY | Facility: MEDICAL CENTER | Age: 62
End: 2017-03-08
Payer: OTHER GOVERNMENT

## 2017-03-08 VITALS
OXYGEN SATURATION: 97 % | RESPIRATION RATE: 14 BRPM | TEMPERATURE: 98.1 F | BODY MASS INDEX: 33.63 KG/M2 | HEART RATE: 89 BPM | WEIGHT: 227.07 LBS | DIASTOLIC BLOOD PRESSURE: 88 MMHG | HEIGHT: 69 IN | SYSTOLIC BLOOD PRESSURE: 154 MMHG

## 2017-03-08 DIAGNOSIS — Z91.89 AT HIGH RISK FOR OSTEOPOROSIS: ICD-10-CM

## 2017-03-08 DIAGNOSIS — C50.511 MALIGNANT NEOPLASM OF LOWER-OUTER QUADRANT OF RIGHT FEMALE BREAST (HCC): ICD-10-CM

## 2017-03-08 DIAGNOSIS — E78.2 MIXED HYPERLIPIDEMIA: ICD-10-CM

## 2017-03-08 PROCEDURE — 99205 OFFICE O/P NEW HI 60 MIN: CPT | Performed by: INTERNAL MEDICINE

## 2017-03-08 RX ORDER — OXYCODONE HYDROCHLORIDE 5 MG/1
TABLET ORAL
Refills: 0 | COMMUNITY
Start: 2017-02-14

## 2017-03-08 RX ORDER — CEPHALEXIN 250 MG/1
CAPSULE ORAL
Refills: 0 | COMMUNITY
Start: 2017-02-22 | End: 2017-04-24

## 2017-03-08 RX ORDER — ANASTROZOLE 1 MG/1
1 TABLET ORAL DAILY
Qty: 30 TAB | Refills: 3 | Status: SHIPPED | OUTPATIENT
Start: 2017-03-08

## 2017-03-08 ASSESSMENT — PATIENT HEALTH QUESTIONNAIRE - PHQ9: CLINICAL INTERPRETATION OF PHQ2 SCORE: 1

## 2017-03-08 ASSESSMENT — PAIN SCALES - GENERAL: PAINLEVEL: 1=MINIMAL PAIN

## 2017-03-08 NOTE — MR AVS SNAPSHOT
"        Juliane Vega   3/8/2017 2:20 PM   Office Visit   MRN: [id number]    Department:  [facility]   Dept Phone:  [phone]    Description:  Female : 1955   Provider:  Maxine Palafox M.D.           Reason for Visit     Establish Care           Allergies as of 3/8/2017     Allergen Noted Reactions    Penicillins 2017   Rash, Itching    [id number]=[id number]      You were diagnosed with     Malignant neoplasm of lower-outer quadrant of right female breast (CMS-HCC)   [642730]       At high risk for osteoporosis   [712204]       Mixed hyperlipidemia   [272.2.ICD-9-CM]         Vital Signs     Blood Pressure Pulse Temperature Respirations Height Weight    154/88 mmHg 89 36.7 °C (98.1 °F) 14 1.753 m (5' 9\") 103 kg (227 lb 1.2 oz)    Body Mass Index Oxygen Saturation Last Menstrual Period Smoking Status          33.52 kg/m2 97% (LMP Unknown) Former Smoker        Basic Information     Date Of Birth Sex Race Ethnicity Preferred Language    1955 Female White, Unknown Non- English      Your appointments     Mar 14, 2017  2:15 PM   BONE DENSITY (DEXASCAN) with DIONICIO REAVES BD 1   [facility]AG[facility]    [facility] [place]  [address]  [address]  [facility] [address] [phone]           No calcium supplements 24 hours prior to exam, including antacids or multivitamins w/calcium.  Pt may eat and drink normally.  No injection procedures prior to Dexa on the same day.  No barium contrast, no CTs with IV or oral contrast, no Pet/CTs and no Nuc Med injections for 7 days prior to a Dexa (including Barium Swallow, Upper GI, Small Bowel Series).  If scheduling a Dexa on the same day as a CT with IV or oral contrast, any test with barium, Pet/CT or a Nuc Med with injection, always schedule the Dexa before the other study.            Mar 22, 2017  1:20 PM   ONCOLOGY EST PATIENT 30 MIN with Maxine Palafox M.D.   [facility] (--)    [address], [address]  [facility] [address]   [phone]            Apr 21, 2017  1:00 PM   Follow Up with Sarah Mcgraw M.D.   [facility] (--)    [address]  [place] [address]   [phone]              Problem List              ICD-10-CM Priority Class Noted - Resolved    Tobacco use disorder F17.200   7/11/2011 - Present    Hypertension I10   7/11/2011 - Present    Cough R05   7/11/2011 - Present    Malignant neoplasm of lower-outer quadrant of right female breast (CMS-HCC) C50.511   2/14/2017 - Present      Health Maintenance        Date Due Completion Dates    IMM PNEUMOCOCCAL 19-64 (ADULT) MEDIUM RISK SERIES (1 of 1 - PPSV23) 2/7/1975 ---    PAP SMEAR 2/7/1977 ---    COLONOSCOPY 2/7/2006 ---    MAMMOGRAM 8/9/2012 8/9/2011    IMM ZOSTER VACCINE 2/7/2016 ---    IMM INFLUENZA (1) 9/1/2016 ---    IMM DTaP/Tdap/Td Vaccine (2 - Td) 7/11/2021 7/11/2011            Current Immunizations     Tdap Vaccine 7/11/2011      Below and/or attached are the medications your provider expects you to take. Review all of your home medications and newly ordered medications with your provider and/or pharmacist. Follow medication instructions as directed by your provider and/or pharmacist. Please keep your medication list with you and share with your provider. Update the information when medications are discontinued, doses are changed, or new medications (including over-the-counter products) are added; and carry medication information at all times in the event of emergency situations     Allergies:  PENICILLINS - Rash,Itching               Medications  Valid as of: March 08, 2017 -  3:38 PM    Generic Name Brand Name Tablet Size Instructions for use    Anastrozole (Tab) ARIMIDEX 1 MG Take 1 Tab by mouth every day.        Benazepril-Hydrochlorothiazide (Tab) LOTENSIN HCT 20-25 MG TAKE 1 TAB BY MOUTH EVERY DAY.        Calcium Carbonate-Vitamin D   Take 1 Tab by mouth every day.        Cephalexin (Cap) KEFLEX 250 MG TK 1 C PO TID FOR  10 DAYS        Coenzyme Q10   Take 200 mg by mouth every day.        Flaxseed (Linseed)   Take 1,000 mg by mouth every day.        Multiple Vitamins-Minerals   Take 1 Tab by mouth every day.        Omega-3 Fatty Acids (Cap) fish oil 1000 MG Take 1,000 mg by mouth every day.        OxyCODONE HCl (Tab) ROXICODONE 5 MG TK 1 TO 2 TS PO Q 4 H PRN P        .                 Medicines prescribed today were sent to:     [id number] - [place], [place] - [address] [address] [facility] [address]    Phone: [phone] Fax: [phone]    Open 24 Hours?: No      Medication refill instructions:       If your prescription bottle indicates you have medication refills left, it is not necessary to call your provider’s office. Please contact your pharmacy and they will refill your medication.    If your prescription bottle indicates you do not have any refills left, you may request refills at any time through one of the following ways: The online [email or web address] system (except Urgent Care), by calling your provider’s office, or by asking your pharmacy to contact your provider’s office with a refill request. Medication refills are processed only during regular business hours and may not be available until the next business day. Your provider may request additional information or to have a follow-up visit with you prior to refilling your medication.   *Please Note: Medication refills are assigned a new Rx number when refilled electronically. Your pharmacy may indicate that no refills were authorized even though a new prescription for the same medication is available at the pharmacy. Please request the medicine by name with the pharmacy before contacting your provider for a refill.        Your To Do List     Future Labs/Procedures Complete By Expires    CBC WITH DIFFERENTIAL  As directed 3/8/2018    COMP METABOLIC PANEL  As directed 3/8/2018    DS-BONE DENSITY STUDY (DEXA)  As directed 3/8/2018    LIPID PROFILE  As directed 3/8/2018       Referral     A referral request has been sent to our patient care coordination department. Please allow 3-5 business days for us to process this request and contact you either by phone or mail. If you do not hear from us by the 5th business day, please call us at [phone].           Pixim Access Code: [email or web address]  Expires: 4/7/2017 12:26 PM    Pixim  A secure, online tool to manage your health information     [employer]’s Pixim® is a secure, online tool that connects you to your personalized health information from the privacy of your home -- day or night - making it very easy for you to manage your healthcare. Once the activation process is completed, you can even access your medical information using the Pixim tonya, which is available for free in the Apple Tonya store or Google Play store.     Pixim provides the following levels of access (as shown below):   My Chart Features   [facility] Primary Care Doctor [facility]  Specialists [facility]  Urgent  Care Non-[facility]  Primary Care  Doctor   Email your healthcare team securely and privately 24/7 X X X    Manage appointments: schedule your next appointment; view details of past/upcoming appointments X      Request prescription refills. X      View recent personal medical records, including lab and immunizations X X X X   View health record, including health history, allergies, medications X X X X   Read reports about your outpatient visits, procedures, consult and ER notes X X X X   See your discharge summary, which is a recap of your hospital and/or ER visit that includes your diagnosis, lab results, and care plan. X X       How to register for Pixim:  1. Go to  [email or web address].  2. Click on the Sign Up Now box, which takes you to the New Member Sign Up page. You will need to provide the following information:  a. Enter your Pixim Access Code exactly as it appears at the top of this page. (You will not need to use this code after  you’ve completed the sign-up process. If you do not sign up before the expiration date, you must request a new code.)   b. Enter your date of birth.   c. Enter your home email address.   d. Click Submit, and follow the next screen’s instructions.  3. Create a [employer] ID. This will be your FanMobt login ID and cannot be changed, so think of one that is secure and easy to remember.  4. Create a [employer] password. You can change your password at any time.  5. Enter your Password Reset Question and Answer. This can be used at a later time if you forget your password.   6. Enter your e-mail address. This allows you to receive e-mail notifications when new information is available in [employer].  7. Click Sign Up. You can now view your health information.    For assistance activating your [employer] account, call [phone]

## 2017-03-08 NOTE — Clinical Note
[facility]    [address], [address]  [place] [address]          To: Dr. Ramirez Washington M.D.  [address]  [place] [address]     Date:3/8/2017                     Reference to Juliane Vega    Consult:  Oncology      Date of Consultation:  3/8/2017  Time: 2:20 pm            Referring Physician: Dr. Ramirez Washington  Primary Care:  GREGORY Giron  Surgeon:  Dr. Ramirez Washington      Radiation Oncology:  Dr. Sarah Mcgraw    GYN:       Diagnosis: New diagnosis of righ breast malignancy      Chief Complaint: She is here to establish care      History of Presenting Illness:  Juliane Vega is a 62 y.o. female with recent diagnosis of right breast malignancy. She has not been getting yearly mammograms and has been getting them periodically. Her last mammogram was in 2013 which was said to be normal. She has been unable to get yearly mammograms but stated that her prior imaging have been fairly stable. Towards the end of 2016 she noticed vaginal bleeding and vomiting. She was seen by her primary care physician who referred her to gynecology.  A mammogram was ordered and she was passed 2. In regards to her vaginal bleeding she underwent D&C and hysteroscopy at [facility] in 12/2016. Pathology was said to be benign and her symptoms have resolved. She underwent a screening mammogram on 12/14/16 which showed a 1.6 speculated mass in the lower outer quadrant of the right breast. 12/28/16 a diagnostic right breast mammogram and ultrasound showed a lesion at the 8:00 position measuring 2.2×1.6×1.6 cm with no concerns for axillary adenopathy. She underwent a biopsy of this area which was positive for invasive ductal carcinoma. She was then seen by Dr. Ortiz and on further discussion underwent a lumpectomy, sentinel node biopsy and SUNDAY placement on 2/14/17.   Final pathology was consistent with a 2.2 cm invasive ductal carcinoma, grade 3, focal high grade  DCIS, clear margins, 0/2 lymph nodes, ER 80%, ND 80%, Ki-67 50% and HER-2 negative. He completed accelerated partial breast radiation and tolerated it fairly well. She has been kindly referred for further discussion about therapeutic options.      She is here to establish care and is accompanied by herself. She has been clinically fairly stable without any significant issues. She continues to have mild tenderness in the right breast surgical site and radiation site. The pain is mostly shooting in nature and has been fairly stable. She is using a vapor nicotine and has had a chronic cough which has been stable. She denies any significant shortness of breath. She denies any headaches, dizziness, nausea, vomiting or abdominal pain. She denies any back pain or bone pain. She has stable energy, appetite and weight. She denies any fevers, chills or night sweats.      Past medical history  1. Right breast malignancy  2. Hypertension  3. Hyperlipidemia  4. Staph infection to the scalp as a child               Past Surgical History   Past Surgical History    Procedure  Laterality  Date    •  Tonsillectomy    1963    •  Tubal ligation    1992    •  Other neurological surg    2006        L5 decompression    •  Mastectomy  Right  2/14/2017        Procedure: MASTECTOMY - PARTIAL;  Surgeon: Ramirez Washington M.D.;  Location: [facility];  Service:     •  Node biopsy sentinel    2/14/2017        Procedure: NODE BIOPSY SENTINEL;  Surgeon: Ramirez Washington M.D.;  Location: [facility];  Service:     •  Axillary node dissection    2/14/2017        Procedure: AXILLARY NODE DISSECTION;  Surgeon: Ramirez Washington M.D.;  Location: [facility];  Service:          12/2016 D&C and hysteroscopy         Social History:  Smoking: She is actively using vapor cigarettes for the past 3 years              She is on 6 mg dose and uses approximate 3 bottles a day              History of one pack a day ×35 years  Alcohol:  Drinks 6-8 drinks per day combination of vodka and beer times 2-3 years              History of 12 pack of beer a week times 35-40 years  Drugs: Remote use of marijuana              Remote use of snorting cocaine ×1 year in 1980s  She lives in [place] with her   She is retired  She was an  for  company  She also worked in [employer] in [facility] as a   She denies any specific exposures         OB/GYN:  LMP: age 56  G2 L1 A1  Menarche: age 14  Pregnancy: age 30  No breast feeding  She has one son  OCP x 10 from age 16 to 25           Family history:  1. Half sister from her biological mother: Breast cancer at age 45  2. She was adopted with unknown history on  3. Father:  of CAD in his 50s               Allergies as of 2017 - Zeyad as Reviewed 2017    Allergen  Reaction  Noted    •  Penicillins  Rash and Itching  2017      Current outpatient prescriptions:    •  oxycodone immediate-release (ROXICODONE) 5 MG Tab, TK 1 TO 2 TS PO Q 4 H PRN P, Disp: , Rfl: 0  •  anastrozole (ARIMIDEX) 1 MG Tab, Take 1 Tab by mouth every day., Disp: 30 Tab, Rfl: 3  •  Coenzyme Q10 (CO Q 10 PO), Take 200 mg by mouth every day., Disp: , Rfl:    •  Omega-3 Fatty Acids (FISH OIL) 1000 MG Cap capsule, Take 1,000 mg by mouth every day., Disp: , Rfl:    •  benazepril-hydrochlorthiazide (LOTENSIN HCT) 20-25 MG per tablet, TAKE 1 TAB BY MOUTH EVERY DAY., Disp: 30 Tab, Rfl: 2  •  Flaxseed, Linseed, (FLAX SEED OIL PO), Take 1,000 mg by mouth every day., Disp: , Rfl:    •  Multiple Vitamins-Minerals (CENTRUM SILVER PO), Take 1 Tab by mouth every day., Disp: , Rfl:    •  cephALEXin (KEFLEX) 250 MG Cap, TK 1 C PO TID FOR 10 DAYS, Disp: , Rfl: 0  •  Calcium Carbonate-Vitamin D (CALCIUM PLUS VITAMIN D PO), Take 1 Tab by mouth every day., Disp: , Rfl:           Review of Systems:  All other review of systems are negative except what was mentioned above in the  "HPI.  Constitutional: Negative for fever, chills, weight loss and malaise/fatigue.    HENT: Negative for ear pain and neck pain.     Eyes: Negative for blurred vision.    Respiratory: Positive for dry cough. Negative for sputum production and shortness of breath.     Cardiovascular: Negative for chest pain and leg swelling.    Gastrointestinal: Negative for nausea, vomiting and abdominal pain.    Genitourinary: Negative for dysuria.    Musculoskeletal: Negative for myalgias, back pain and joint pain.    Skin: Negative for rash.    Neurological: Negative for dizziness, sensory change, focal weakness and headaches.    Endo/Heme/Allergies: No bruise/bleed easily.    Psychiatric/Behavioral: Negative for depression and memory loss.        Physical Exam:   Vitals   Filed Vitals:      03/08/17 1417    BP:  154/88    Pulse:  89    Temp:  36.7 °C (98.1 °F)    Resp:  14    Height:  1.753 m (5' 9\")    Weight:  103 kg (227 lb 1.2 oz)    SpO2:  97%             General: Not in acute distress, alert and oriented x 3  HEENT: normalcephalic, atraumatic, pupils equally reactive to light bilaterally, extra ocular muscles intact, moist oral mucus membranes, and oral cavity without any lesions.  Neck: Supple neck and full range of motion  Lymph nodes: No palpable bilateral cervical, supraclavicular and axillary lymphadenopathy.     CVS: regular rate and rhythm  RESP: Clear to auscultate bilaterally.  Breast exam:  Right breast: Well-healing surgical site with post surgical and radiation changes. No concerning palpable masses in the breast or axilla.  Left breast: No concerning palpable masses in the breast or axilla.   ABD: Soft, non tender, non distended, positive bowel sounds, and no palpable hepatomegaly    EXT: No edema or cyanosis  CNS: Alert and oriented x3, cranial nerves II to XII grossly intact, muscle strength grossly intact, and normal gait.           Labs:  No visits with results within 1 Day(s) from this visit.  Latest " known visit with results is:      Orders Only on 2017    Component  Date  Value  Ref Range  Status    •  Report  2017       Final                     Value:[facility]       Test Date:  2017  Pt Name:    JIM SRINIVASAN             Department: [facility]  MRN:        [id number]                      Room:  Gender:     F                            Technician: [facility]  :        [id number]                   Requested By:JAYCE NEVAREZ  Order #:    [id number]                    Reading MD: Karl Suárez MD      Measurements  Intervals                                Axis  Rate:       91                           P:          60  ND:         140                          QRS:        -19  QRSD:       84                           T:          26  QT:         356  QTc:        439      Interpretive Statements  SINUS RHYTHM  No previous ECG available for comparison      Electronically Signed On 2017 14:27:38 PST by Karl Suárez MD      •  WBC  2017  8.0   4.8 - 10.8 K/uL  Final    •  RBC  2017  4.78   4.20 - 5.40 M/uL  Final    •  Hemoglobin  2017  14.4   12.0 - 16.0 g/dL  Final    •  Hematocrit  2017  43.8   37.0 - 47.0 %  Final    •  MCV  2017  91.6   81.4 - 97.8 fL  Final    •  MCH  2017  30.1   27.0 - 33.0 pg  Final    •  MCHC  2017  32.9*  33.6 - 35.0 g/dL  Final    •  RDW  2017  40.3   35.9 - 50.0 fL  Final    •  Platelet Count  2017  327   164 - 446 K/uL  Final    •  MPV  2017  10.3   9.0 - 12.9 fL  Final    •  Sodium  2017  137   135 - 145 mmol/L  Final    •  Potassium  2017  3.6   3.6 - 5.5 mmol/L  Final    •  Chloride  2017  100   96 - 112 mmol/L  Final    •  Co2  2017  26   20 - 33 mmol/L  Final    •  Glucose  2017  111*  65 - 99 mg/dL  Final    •  Bun  2017  9   8 - 22 mg/dL  Final    •  Creatinine  2017  0.63   0.50 - 1.40 mg/dL  Final    •  Calcium  2017  9.2   8.5 - 10.5 mg/dL   Final    •  Anion Gap  2017  11.0   0.0 - 11.9  Final    •  GFR If   2017  >60   >60 mL/min/1.73 m 2  Final    •  GFR If Non   2017  >60   >60 mL/min/1.73 m 2  Final              Imagin.   16 right breast ultrasound/mammogram: In the area of mammographic abnormality at the 8:00 position there is a large irregular hyperdense shadowing mass with angular margins. It measures 2.2×1.6×1.6 cm. There is no axillary lymphadenopathy.  2.  16 Screening mammogram: The breasts are composed of scattered fibroglandular densities. Since her prior exam there is a new 1.6 cm spiculated mass in the lower outer right breast.           Pathology:      1. 17:       FINAL DIAGNOSIS:    A. Right sentinel lymph node #1:         One benign lymph node negative for metastatic carcinoma by          sentinel node protocol including multiple H&E levels (0/1).  B. Right sentinel lymph node #2:         One benign lymph node negative for metastatic carcinoma by          sentinel node protocol including multiple H&E levels (0/1).  C. Right axillary contents:         Benign fibroadipose tissue demonstrating scattered benign breast          glandular epithelium.  One tiny fragment of benign lymphoid          tissue is noted.  No intact lymph nodes are seen.         No malignancy is seen.  D. Right breast tissue:         Invasive ductal carcinoma, 2.2 cm, grade 3.         Focal high grade ductal carcinoma in situ with comedonecrosis is          also present.         The invasive ductal carcinoma in free of the surgical margins.         Benign skin negative for malignancy.         See synoptic report and comment below.      2. 16: Right breast mass biopsy: Positive for invasive ductal carcinoma.      Assessment & Plan:      1. Right breast invasive ductal carcinoma, 2.2 cm, 0/2 lymph nodes, associated DCIS, Ki-67 50%, ER 80%, ND 80%, HER-2 negative: pT2 pN0:  Patient with  significant history of tobacco use and alcohol use was diagnosed secondary to abnormal screening mammogram. Patient has not been getting regular screening mammograms. She was found to have an abnormality in the right breast biopsy of which was positive for invasive ductal carcinoma. She underwent lumpectomy followed by accelerated partial breast irradiation with SUNDAY.  Final pathology was consistent with invasive ductal carcinoma which was 2.2 cm in size, associated DCIS, clear margins and 0/2 lymph nodes. She is found to have a Ki-67 of 50% with ER 80%, WY 80% and HER-2 negative.      I had a long discussion with the patient in regards to her current status. I went over the available imaging studies as well as the pathology report. We had a discussion about ductal carcinoma of breast in general including staging workup and treatment options. Patient is found to have 2.2 cm tumor hence she is a T2 with negative sentinel nodes. She underwent a lumpectomy with clear margins and tumor is strongly ER/WY positive and HER-2 negative. Pathology however was grade 3 and she is found to have a high Ki-67. Went over the indications for surgery, chemotherapy, radiation as well as hormonal therapy. Secondary to some concerning findings and her pathology and had a more detailed discussion about chemotherapy and also had a discussion about consideration for Oncotype. Went over the possible results associated with Oncotype. Patient is interested in getting an Oncotype DX and has been ordered. She has already completed partial breast radiation. She is a candidate for hormonal therapy as she is strongly ER positive. I went over some of the side effects associated with hormonal therapy and recommended Arimidex for 5-10 years.       2. She is agreeable for an Oncotype DX and has been ordered. And I informed the patient that if she is found to have high risk or then she will be a candidate for chemotherapy. Prescription for Arimidex has  been written and she is also been given information on Arimidex as well as breast malignancy. I will order a baseline labs including CBC, CMP as well as a lipid panel. I will also get a baseline bone density scan.      3. Tobacco use and alcohol: I went over some of the risk factors associated with breast malignancy as well as general malignancy. She extensive alcoholic use and is strongly advised to stop alcoholic intake as there is a correlation with breast malignancy. She is currently on vapor nicotine and is advised to also stop tobacco use. She has significant alcoholic use and may have elevated liver enzymes. If she is found to have elevated liver enzymes she will need further imaging.       4. Genetics: She is found to have a ER/OK positive and HER-2 negative breast malignancy. There is some family history of half-sister with breast malignancy. Patient unfortunately is adopted and does not know her full family history. She has already been referred to genetics by Dr. Washington and is pending      5. She is advised to follow up again in approximately 2 weeks or sooner as needed.          She agreed and verbalized her agreement and understanding with the current plan. I answered all questions and concerns she has at this time and advised her to call at any time in the interim with questions or concerns in regards to her care. Thank you for allowing me to participate in her care, I will continue to follow.      Please note that this dictation was created using voice recognition software. I have made every reasonable attempt to correct obvious errors, but I expect that there are errors of grammar and possibly content that I did not discover before finalizing the note.      Sincerely,  Maxine Palafox  [address] [place] [address], [address]   [phone]

## 2017-03-08 NOTE — PROGRESS NOTES
Consult:  Oncology    Date of Consultation:  3/8/2017  Time: 2:20 pm        Referring Physician: Dr. Ramirez Washington  Primary Care:  GREGORY Giron  Surgeon:  Dr. Ramirez Washington   Radiation Oncology:  Dr. Sarah Mcgraw   GYN:     Diagnosis: New diagnosis of righ breast malignancy    Chief Complaint: She is here to establish care    History of Presenting Illness:  Juliane Vega is a 62 y.o. female with recent diagnosis of right breast malignancy. She has not been getting yearly mammograms and has been getting them periodically. Her last mammogram was in 2013 which was said to be normal. She has been unable to get yearly mammograms but stated that her prior imaging have been fairly stable. Towards the end of 2016 she noticed vaginal bleeding and vomiting. She was seen by her primary care physician who referred her to gynecology.  A mammogram was ordered and she was passed 2. In regards to her vaginal bleeding she underwent D&C and hysteroscopy at [facility] in 12/2016. Pathology was said to be benign and her symptoms have resolved. She underwent a screening mammogram on 12/14/16 which showed a 1.6 speculated mass in the lower outer quadrant of the right breast. 12/28/16 a diagnostic right breast mammogram and ultrasound showed a lesion at the 8:00 position measuring 2.2×1.6×1.6 cm with no concerns for axillary adenopathy. She underwent a biopsy of this area which was positive for invasive ductal carcinoma. She was then seen by Dr. Ortiz and on further discussion underwent a lumpectomy, sentinel node biopsy and SUNDAY placement on 2/14/17.   Final pathology was consistent with a 2.2 cm invasive ductal carcinoma, grade 3, focal high grade DCIS, clear margins, 0/2 lymph nodes, ER 80%, SC 80%, Ki-67 50% and HER-2 negative. He completed accelerated partial breast radiation and tolerated it fairly well. She has been kindly referred for further discussion about therapeutic options.    She is here to establish  care and is accompanied by herself. She has been clinically fairly stable without any significant issues. She continues to have mild tenderness in the right breast surgical site and radiation site. The pain is mostly shooting in nature and has been fairly stable. She is using a vapor nicotine and has had a chronic cough which has been stable. She denies any significant shortness of breath. She denies any headaches, dizziness, nausea, vomiting or abdominal pain. She denies any back pain or bone pain. She has stable energy, appetite and weight. She denies any fevers, chills or night sweats.    Past medical history  1. Right breast malignancy  2. Hypertension  3. Hyperlipidemia  4. Staph infection to the scalp as a child        Past Surgical History   Procedure Laterality Date   • Tonsillectomy  1963   • Tubal ligation  1992   • Other neurological surg  2006     L5 decompression   • Mastectomy Right 2/14/2017     Procedure: MASTECTOMY - PARTIAL;  Surgeon: Ramirez Washington M.D.;  Location: SURGERY [facility];  Service:    • Node biopsy sentinel  2/14/2017     Procedure: NODE BIOPSY SENTINEL;  Surgeon: Ramirez Washington M.D.;  Location: [facility];  Service:    • Axillary node dissection  2/14/2017     Procedure: AXILLARY NODE DISSECTION;  Surgeon: Ramirez Washington M.D.;  Location: [facility];  Service:    12/2016 D&C and hysteroscopy       Social History:  Smoking: She is actively using vapor cigarettes for the past 3 years   She is on 6 mg dose and uses approximate 3 bottles a day   History of one pack a day ×35 years  Alcohol: Drinks 6-8 drinks per day combination of vodka and beer times 2-3 years   History of 12 pack of beer a week times 35-40 years  Drugs: Remote use of marijuana   Remote use of snorting cocaine ×1 year in 1980s  She lives in [place] with her   She is retired  She was an  for [employer]  She also worked in [employer] in [place]  [place] as a   She denies any specific exposures       OB/GYN:  LMP: age 56  G2 L1 A1  Menarche: age 14  Pregnancy: age 30  No breast feeding  She has one son  OCP x 10 from age 16 to 25       Family history:  1. Half sister from her biological mother: Breast cancer at age 45  2. She was adopted with unknown history on  3. Father:  of CAD in his 50s         Allergies as of 2017 - Zeyad as Reviewed 2017   Allergen Reaction Noted   • Penicillins Rash and Itching 2017       Current outpatient prescriptions:   •  oxycodone immediate-release (ROXICODONE) 5 MG Tab, TK 1 TO 2 TS PO Q 4 H PRN P, Disp: , Rfl: 0  •  anastrozole (ARIMIDEX) 1 MG Tab, Take 1 Tab by mouth every day., Disp: 30 Tab, Rfl: 3  •  Coenzyme Q10 (CO Q 10 PO), Take 200 mg by mouth every day., Disp: , Rfl:   •  Omega-3 Fatty Acids (FISH OIL) 1000 MG Cap capsule, Take 1,000 mg by mouth every day., Disp: , Rfl:   •  benazepril-hydrochlorthiazide (LOTENSIN HCT) 20-25 MG per tablet, TAKE 1 TAB BY MOUTH EVERY DAY., Disp: 30 Tab, Rfl: 2  •  Flaxseed, Linseed, (FLAX SEED OIL PO), Take 1,000 mg by mouth every day., Disp: , Rfl:   •  Multiple Vitamins-Minerals (CENTRUM SILVER PO), Take 1 Tab by mouth every day., Disp: , Rfl:   •  cephALEXin (KEFLEX) 250 MG Cap, TK 1 C PO TID FOR 10 DAYS, Disp: , Rfl: 0  •  Calcium Carbonate-Vitamin D (CALCIUM PLUS VITAMIN D PO), Take 1 Tab by mouth every day., Disp: , Rfl:       Review of Systems:  All other review of systems are negative except what was mentioned above in the HPI.  Constitutional: Negative for fever, chills, weight loss and malaise/fatigue.    HENT: Negative for ear pain and neck pain.     Eyes: Negative for blurred vision.    Respiratory: Positive for dry cough. Negative for sputum production and shortness of breath.    Cardiovascular: Negative for chest pain and leg swelling.    Gastrointestinal: Negative for nausea, vomiting and abdominal pain.    Genitourinary: Negative  "for dysuria.    Musculoskeletal: Negative for myalgias, back pain and joint pain.    Skin: Negative for rash.    Neurological: Negative for dizziness, sensory change, focal weakness and headaches.    Endo/Heme/Allergies: No bruise/bleed easily.    Psychiatric/Behavioral: Negative for depression and memory loss.      Physical Exam:  Filed Vitals:    17 1417   BP: 154/88   Pulse: 89   Temp: 36.7 °C (98.1 °F)   Resp: 14   Height: 1.753 m (5' 9\")   Weight: 103 kg (227 lb 1.2 oz)   SpO2: 97%       General: Not in acute distress, alert and oriented x 3  HEENT: normalcephalic, atraumatic, pupils equally reactive to light bilaterally, extra ocular muscles intact, moist oral mucus membranes, and oral cavity without any lesions.  Neck: Supple neck and full range of motion  Lymph nodes: No palpable bilateral cervical, supraclavicular and axillary lymphadenopathy.    CVS: regular rate and rhythm  RESP: Clear to auscultate bilaterally.  Breast exam:  Right breast: Well-healing surgical site with post surgical and radiation changes. No concerning palpable masses in the breast or axilla.  Left breast: No concerning palpable masses in the breast or axilla.   ABD: Soft, non tender, non distended, positive bowel sounds, and no palpable hepatomegaly   EXT: No edema or cyanosis  CNS: Alert and oriented x3, cranial nerves II to XII grossly intact, muscle strength grossly intact, and normal gait.       Labs:  No visits with results within 1 Day(s) from this visit.  Latest known visit with results is:    Orders Only on 2017   Component Date Value Ref Range Status   • Report 2017    Final                    Value:[facility]    Test Date:  2017  Pt Name:    JIM SRINIVASAN             Department: [facility]  MRN:        [id number]                      Room:  Gender:     F                            Technician: [facility]  :        [id number]                   Requested By:JAYCE NEVAREZ  Order #:    [id number]             "        Reading MD: Karl Suárez MD    Measurements  Intervals                                Axis  Rate:       91                           P:          60  VA:         140                          QRS:        -19  QRSD:       84                           T:          26  QT:         356  QTc:        439    Interpretive Statements  SINUS RHYTHM  No previous ECG available for comparison    Electronically Signed On 2017 14:27:38 PST by Karl Suárez MD     • WBC 2017 8.0  4.8 - 10.8 K/uL Final   • RBC 2017 4.78  4.20 - 5.40 M/uL Final   • Hemoglobin 2017 14.4  12.0 - 16.0 g/dL Final   • Hematocrit 2017 43.8  37.0 - 47.0 % Final   • MCV 2017 91.6  81.4 - 97.8 fL Final   • MCH 2017 30.1  27.0 - 33.0 pg Final   • MCHC 2017 32.9* 33.6 - 35.0 g/dL Final   • RDW 2017 40.3  35.9 - 50.0 fL Final   • Platelet Count 2017 327  164 - 446 K/uL Final   • MPV 2017 10.3  9.0 - 12.9 fL Final   • Sodium 2017 137  135 - 145 mmol/L Final   • Potassium 2017 3.6  3.6 - 5.5 mmol/L Final   • Chloride 2017 100  96 - 112 mmol/L Final   • Co2 2017 26  20 - 33 mmol/L Final   • Glucose 2017 111* 65 - 99 mg/dL Final   • Bun 2017 9  8 - 22 mg/dL Final   • Creatinine 2017 0.63  0.50 - 1.40 mg/dL Final   • Calcium 2017 9.2  8.5 - 10.5 mg/dL Final   • Anion Gap 2017 11.0  0.0 - 11.9 Final   • GFR If  2017 >60  >60 mL/min/1.73 m 2 Final   • GFR If Non  2017 >60  >60 mL/min/1.73 m 2 Final       Imagin.   16 right breast ultrasound/mammogram: In the area of mammographic abnormality at the 8:00 position there is a large irregular hyperdense shadowing mass with angular margins. It measures 2.2×1.6×1.6 cm. There is no axillary lymphadenopathy.  2.  16 Screening mammogram: The breasts are composed of scattered fibroglandular densities. Since her prior exam there is a new  1.6 cm spiculated mass in the lower outer right breast.       Pathology:    1. 2/14/17:     FINAL DIAGNOSIS:    A. Right sentinel lymph node #1:         One benign lymph node negative for metastatic carcinoma by          sentinel node protocol including multiple H&E levels (0/1).  B. Right sentinel lymph node #2:         One benign lymph node negative for metastatic carcinoma by          sentinel node protocol including multiple H&E levels (0/1).  C. Right axillary contents:         Benign fibroadipose tissue demonstrating scattered benign breast          glandular epithelium.  One tiny fragment of benign lymphoid          tissue is noted.  No intact lymph nodes are seen.         No malignancy is seen.  D. Right breast tissue:         Invasive ductal carcinoma, 2.2 cm, grade 3.         Focal high grade ductal carcinoma in situ with comedonecrosis is          also present.         The invasive ductal carcinoma in free of the surgical margins.         Benign skin negative for malignancy.         See synoptic report and comment below.    2. 12/20/16: Right breast mass biopsy: Positive for invasive ductal carcinoma.    Assessment & Plan:    1. Right breast invasive ductal carcinoma, 2.2 cm, 0/2 lymph nodes, associated DCIS, Ki-67 50%, ER 80%, MD 80%, HER-2 negative: pT2 pN0:  Patient with significant history of tobacco use and alcohol use was diagnosed secondary to abnormal screening mammogram. Patient has not been getting regular screening mammograms. She was found to have an abnormality in the right breast biopsy of which was positive for invasive ductal carcinoma. She underwent lumpectomy followed by accelerated partial breast irradiation with SUNDAY.  Final pathology was consistent with invasive ductal carcinoma which was 2.2 cm in size, associated DCIS, clear margins and 0/2 lymph nodes. She is found to have a Ki-67 of 50% with ER 80%, MD 80% and HER-2 negative.    I had a long discussion with the patient in regards  to her current status. I went over the available imaging studies as well as the pathology report. We had a discussion about ductal carcinoma of breast in general including staging workup and treatment options. Patient is found to have 2.2 cm tumor hence she is a T2 with negative sentinel nodes. She underwent a lumpectomy with clear margins and tumor is strongly ER/HI positive and HER-2 negative. Pathology however was grade 3 and she is found to have a high Ki-67. Went over the indications for surgery, chemotherapy, radiation as well as hormonal therapy. Secondary to some concerning findings and her pathology and had a more detailed discussion about chemotherapy and also had a discussion about consideration for Oncotype. Went over the possible results associated with Oncotype. Patient is interested in getting an Oncotype DX and has been ordered. She has already completed partial breast radiation. She is a candidate for hormonal therapy as she is strongly ER positive. I went over some of the side effects associated with hormonal therapy and recommended Arimidex for 5-10 years.     2. She is agreeable for an Oncotype DX and has been ordered. And I informed the patient that if she is found to have high risk or then she will be a candidate for chemotherapy. Prescription for Arimidex has been written and she is also been given information on Arimidex as well as breast malignancy. I will order a baseline labs including CBC, CMP as well as a lipid panel. I will also get a baseline bone density scan.    3. Tobacco use and alcohol: I went over some of the risk factors associated with breast malignancy as well as general malignancy. She extensive alcoholic use and is strongly advised to stop alcoholic intake as there is a correlation with breast malignancy. She is currently on vapor nicotine and is advised to also stop tobacco use. She has significant alcoholic use and may have elevated liver enzymes. If she is found to have  elevated liver enzymes she will need further imaging.     4. Genetics: She is found to have a ER/ND positive and HER-2 negative breast malignancy. There is some family history of half-sister with breast malignancy. Patient unfortunately is adopted and does not know her full family history. She has already been referred to genetics by Dr. Washington and is pending    5. She is advised to follow up again in approximately 2 weeks or sooner as needed.      She agreed and verbalized her agreement and understanding with the current plan. I answered all questions and concerns she has at this time and advised her to call at any time in the interim with questions or concerns in regards to her care. Thank you for allowing me to participate in her care, I will continue to follow.    Please note that this dictation was created using voice recognition software. I have made every reasonable attempt to correct obvious errors, but I expect that there are errors of grammar and possibly content that I did not discover before finalizing the note.

## 2017-03-13 ENCOUNTER — TELEPHONE (OUTPATIENT)
Dept: HEMATOLOGY ONCOLOGY | Facility: MEDICAL CENTER | Age: 62
End: 2017-03-13

## 2017-03-13 NOTE — TELEPHONE ENCOUNTER
Nutrition Services:     RD referral received via [facility], called pt to set up nutrition consultation. Pt refused consultation at this time; however, took down RD contact information and states will call us if she changes her mind.     RD available PRN if pt prefers nutrition consultation in the future.

## 2017-03-14 ENCOUNTER — HOSPITAL ENCOUNTER (OUTPATIENT)
Dept: RADIOLOGY | Facility: MEDICAL CENTER | Age: 62
End: 2017-03-14
Attending: INTERNAL MEDICINE
Payer: OTHER GOVERNMENT

## 2017-03-14 DIAGNOSIS — Z91.89 AT HIGH RISK FOR OSTEOPOROSIS: ICD-10-CM

## 2017-03-14 PROCEDURE — 77080 DXA BONE DENSITY AXIAL: CPT

## 2017-03-16 ENCOUNTER — HOSPITAL ENCOUNTER (OUTPATIENT)
Dept: LAB | Facility: MEDICAL CENTER | Age: 62
End: 2017-03-16
Attending: INTERNAL MEDICINE
Payer: OTHER GOVERNMENT

## 2017-03-16 LAB
ALBUMIN SERPL BCP-MCNC: 4.2 G/DL (ref 3.2–4.9)
ALBUMIN/GLOB SERPL: 1.5 G/DL
ALP SERPL-CCNC: 57 U/L (ref 30–99)
ALT SERPL-CCNC: 21 U/L (ref 2–50)
ANION GAP SERPL CALC-SCNC: 7 MMOL/L (ref 0–11.9)
AST SERPL-CCNC: 23 U/L (ref 12–45)
BASOPHILS # BLD AUTO: 0.04 K/UL (ref 0–0.12)
BASOPHILS NFR BLD AUTO: 0.8 % (ref 0–1.8)
BILIRUB SERPL-MCNC: 0.9 MG/DL (ref 0.1–1.5)
BUN SERPL-MCNC: 9 MG/DL (ref 8–22)
CALCIUM SERPL-MCNC: 9.5 MG/DL (ref 8.5–10.5)
CHLORIDE SERPL-SCNC: 98 MMOL/L (ref 96–112)
CHOLEST SERPL-MCNC: 235 MG/DL (ref 100–199)
CO2 SERPL-SCNC: 30 MMOL/L (ref 20–33)
CREAT SERPL-MCNC: 0.59 MG/DL (ref 0.5–1.4)
EOSINOPHIL # BLD: 0.09 K/UL (ref 0–0.51)
EOSINOPHIL NFR BLD AUTO: 1.7 % (ref 0–6.9)
ERYTHROCYTE [DISTWIDTH] IN BLOOD BY AUTOMATED COUNT: 41.9 FL (ref 35.9–50)
GLOBULIN SER CALC-MCNC: 2.8 G/DL (ref 1.9–3.5)
GLUCOSE SERPL-MCNC: 99 MG/DL (ref 65–99)
HCT VFR BLD AUTO: 46.3 % (ref 37–47)
HDLC SERPL-MCNC: 90 MG/DL
HGB BLD-MCNC: 15 G/DL (ref 12–16)
IMM GRANULOCYTES # BLD AUTO: 0.01 K/UL (ref 0–0.11)
IMM GRANULOCYTES NFR BLD AUTO: 0.2 % (ref 0–0.9)
LDLC SERPL CALC-MCNC: 128 MG/DL
LYMPHOCYTES # BLD: 1.09 K/UL (ref 1–4.8)
LYMPHOCYTES NFR BLD AUTO: 20.7 % (ref 22–41)
MCH RBC QN AUTO: 29.9 PG (ref 27–33)
MCHC RBC AUTO-ENTMCNC: 32.4 G/DL (ref 33.6–35)
MCV RBC AUTO: 92.4 FL (ref 81.4–97.8)
MONOCYTES # BLD: 0.62 K/UL (ref 0–0.85)
MONOCYTES NFR BLD AUTO: 11.8 % (ref 0–13.4)
NEUTROPHILS # BLD: 3.41 K/UL (ref 2–7.15)
NEUTROPHILS NFR BLD AUTO: 64.8 % (ref 44–72)
NRBC # BLD AUTO: 0 K/UL
NRBC BLD-RTO: 0 /100 WBC
PLATELET # BLD AUTO: 264 K/UL (ref 164–446)
PMV BLD AUTO: 10 FL (ref 9–12.9)
POTASSIUM SERPL-SCNC: 4 MMOL/L (ref 3.6–5.5)
PROT SERPL-MCNC: 7 G/DL (ref 6–8.2)
RBC # BLD AUTO: 5.01 M/UL (ref 4.2–5.4)
SODIUM SERPL-SCNC: 135 MMOL/L (ref 135–145)
TRIGL SERPL-MCNC: 87 MG/DL (ref 0–149)
WBC # BLD AUTO: 5.3 K/UL (ref 4.8–10.8)

## 2017-03-16 PROCEDURE — 36415 COLL VENOUS BLD VENIPUNCTURE: CPT

## 2017-03-16 PROCEDURE — 80061 LIPID PANEL: CPT

## 2017-03-16 PROCEDURE — 80053 COMPREHEN METABOLIC PANEL: CPT

## 2017-03-16 PROCEDURE — 85025 COMPLETE CBC W/AUTO DIFF WBC: CPT

## 2017-03-20 ENCOUNTER — HOSPITAL ENCOUNTER (OUTPATIENT)
Dept: RADIOLOGY | Facility: MEDICAL CENTER | Age: 62
End: 2017-03-20

## 2017-03-22 ENCOUNTER — OFFICE VISIT (OUTPATIENT)
Dept: HEMATOLOGY ONCOLOGY | Facility: MEDICAL CENTER | Age: 62
End: 2017-03-22
Payer: OTHER GOVERNMENT

## 2017-03-22 VITALS
HEART RATE: 106 BPM | WEIGHT: 225.53 LBS | RESPIRATION RATE: 18 BRPM | BODY MASS INDEX: 33.4 KG/M2 | HEIGHT: 69 IN | DIASTOLIC BLOOD PRESSURE: 84 MMHG | SYSTOLIC BLOOD PRESSURE: 136 MMHG | TEMPERATURE: 97.7 F | OXYGEN SATURATION: 96 %

## 2017-03-22 DIAGNOSIS — C50.511 MALIGNANT NEOPLASM OF LOWER-OUTER QUADRANT OF RIGHT FEMALE BREAST (HCC): ICD-10-CM

## 2017-03-22 PROCEDURE — 99214 OFFICE O/P EST MOD 30 MIN: CPT | Performed by: INTERNAL MEDICINE

## 2017-03-22 ASSESSMENT — PAIN SCALES - GENERAL: PAINLEVEL: 1=MINIMAL PAIN

## 2017-03-22 NOTE — Clinical Note
[facility]    [address], [address]  [place] [address]      Date:3/26/2017                     Reference to Juliane Vega    Follow Up Note:  Oncology      Date: 3/22/17  Time: 1:20 pm            Referring Physician: Dr. Ramirez Washington  Primary Care:  GREGORY Giron  Surgeon:  Dr. Ramirez Washington      Radiation Oncology:  Dr. Sarah Mcgraw    GYN:       Diagnosis: Right breast invasive ductal carcinoma, 2.2 cm, 0/2 lymph nodes, associated DCIS, Ki-67 50%, ER 80%, IN 80%, HER-2 negative: pT2 pN0      Chief Complaint: She is here for a follow up visit      History of Presenting Illness:  Juliane Vega is a 62 y.o. female diagnosed in 12/2016 with right breast malignancy secondary to abnormal findings on mammogram. Diagnosis she also had no bleeding and underwent D&C with hysteroscopy. Pathology was benign. 12/2016 screening mammogram showed a 1.6 cm mass in the lower outer quadrant of the right breast.  Diagnostic mammogram showed a 2.2 m lesion at 8:00 position.  Biopsy was positive for invasive ductal carcinoma. She then underwent a lumpectomy and so no biopsy with SUNDAY on 2/14/17.  Pathology showed a 2.2 cm invasive ductal carcinoma, grade 3, focal high grade DCIS, clear margins, 0/2 lymph nodes, ER 80%, IN 80%, Ki-67 50% and HER-2 negative. She completed acceleration partial breast radiation.  Started on Arimidex on 3/12/17. An Oncotype DX was ordered with a score of 22.      Interval history:  She is here for a follow-up visit.  He has been feeling fairly well since her last visit. She has started Arimidex on 3/12/17 and has been tolerating it well.  She denies any significant new symptoms since starting Arimidex. She continues to have a mild cough which has been stable. She is still using vapor nicotine but has cut down on the dose. She otherwise has stable energy, appetite and weight. She denies any fevers, chills or night sweats. She denies any  nausea, vomiting or abdominal pain.          Past medical history  1. Right breast malignancy  2. Hypertension  3. Hyperlipidemia  4. Staph infection to the scalp as a child                  Allergies as of 03/22/2017 - Zeyad as Reviewed 03/22/2017    Allergen  Reaction  Noted    •  Penicillins  Rash and Itching  02/06/2017      Current outpatient prescriptions:    •  anastrozole (ARIMIDEX) 1 MG Tab, Take 1 Tab by mouth every day., Disp: 30 Tab, Rfl: 3  •  Coenzyme Q10 (CO Q 10 PO), Take 200 mg by mouth every day., Disp: , Rfl:    •  Omega-3 Fatty Acids (FISH OIL) 1000 MG Cap capsule, Take 1,000 mg by mouth every day., Disp: , Rfl:    •  Calcium Carbonate-Vitamin D (CALCIUM PLUS VITAMIN D PO), Take 1 Tab by mouth every day., Disp: , Rfl:    •  benazepril-hydrochlorthiazide (LOTENSIN HCT) 20-25 MG per tablet, TAKE 1 TAB BY MOUTH EVERY DAY., Disp: 30 Tab, Rfl: 2  •  Flaxseed, Linseed, (FLAX SEED OIL PO), Take 1,000 mg by mouth every day., Disp: , Rfl:    •  Multiple Vitamins-Minerals (CENTRUM SILVER PO), Take 1 Tab by mouth every day., Disp: , Rfl:    •  cephALEXin (KEFLEX) 250 MG Cap, TK 1 C PO TID FOR 10 DAYS, Disp: , Rfl: 0  •  oxycodone immediate-release (ROXICODONE) 5 MG Tab, TK 1 TO 2 TS PO Q 4 H PRN P, Disp: , Rfl: 0          Review of Systems:  All other review of systems are negative except what was mentioned above in the HPI.  Constitutional: Negative for fever, chills, weight loss and malaise/fatigue.    HENT: Negative for ear pain and neck pain.     Eyes: Negative for blurred vision.    Respiratory: Positive for dry cough. Negative for sputum production and shortness of breath.     Cardiovascular: Negative for chest pain and leg swelling.    Gastrointestinal: Negative for nausea, vomiting and abdominal pain.    Genitourinary: Negative for dysuria.    Musculoskeletal: Negative for myalgias, back pain and joint pain.    Skin: Negative for rash.    Neurological: Negative for dizziness, sensory change, focal  "weakness and headaches.    Endo/Heme/Allergies: No bruise/bleed easily.    Psychiatric/Behavioral: Negative for depression and memory loss.        Physical Exam:   Vitals   Filed Vitals:      03/22/17 1253    BP:  136/84    Pulse:  106    Temp:  36.5 °C (97.7 °F)    Resp:  18    Height:  1.753 m (5' 9\")    Weight:  102.3 kg (225 lb 8.5 oz)    SpO2:  96%             General: Not in acute distress, alert and oriented x 3  HEENT: normalcephalic, atraumatic, extra ocular muscles intact, moist oral mucus membranes, and oral cavity without any lesions.  Neck: Supple neck and full range of motion  Lymph nodes: No palpable bilateral cervical and supraclavicular lymphadenopathy.     CVS: regular rate and rhythm  RESP: Clear to auscultate bilaterally.  Breast exam: Differed  ABD: Soft, non tender, non distended, positive bowel sounds, and no palpable hepatomegaly    EXT: No edema or cyanosis  CNS: Alert and oriented x3, cranial nerves grossly intact, muscle strength grossly intact, and normal gait.           Labs:    No visits with results within 1 Day(s) from this visit.  Latest known visit with results is:      Hospital Outpatient Visit on 03/16/2017    Component  Date  Value  Ref Range  Status    •  WBC  03/16/2017  5.3   4.8 - 10.8 K/uL  Final    •  RBC  03/16/2017  5.01   4.20 - 5.40 M/uL  Final    •  Hemoglobin  03/16/2017  15.0   12.0 - 16.0 g/dL  Final    •  Hematocrit  03/16/2017  46.3   37.0 - 47.0 %  Final    •  MCV  03/16/2017  92.4   81.4 - 97.8 fL  Final    •  MCH  03/16/2017  29.9   27.0 - 33.0 pg  Final    •  MCHC  03/16/2017  32.4*  33.6 - 35.0 g/dL  Final    •  RDW  03/16/2017  41.9   35.9 - 50.0 fL  Final    •  Platelet Count  03/16/2017  264   164 - 446 K/uL  Final    •  MPV  03/16/2017  10.0   9.0 - 12.9 fL  Final    •  Neutrophils-Polys  03/16/2017  64.80   44.00 - 72.00 %  Final    •  Lymphocytes  03/16/2017  20.70*  22.00 - 41.00 %  Final    •  Monocytes  03/16/2017  11.80   0.00 - 13.40 %  Final    •  " Eosinophils  03/16/2017  1.70   0.00 - 6.90 %  Final    •  Basophils  03/16/2017  0.80   0.00 - 1.80 %  Final    •  Immature Granulocytes  03/16/2017  0.20   0.00 - 0.90 %  Final    •  Nucleated RBC  03/16/2017  0.00     Final    •  Neutrophils (Absolute)  03/16/2017  3.41   2.00 - 7.15 K/uL  Final      Includes immature neutrophils, if present.    •  Lymphs (Absolute)  03/16/2017  1.09   1.00 - 4.80 K/uL  Final    •  Monos (Absolute)  03/16/2017  0.62   0.00 - 0.85 K/uL  Final    •  Eos (Absolute)  03/16/2017  0.09   0.00 - 0.51 K/uL  Final    •  Baso (Absolute)  03/16/2017  0.04   0.00 - 0.12 K/uL  Final    •  Immature Granulocytes (abs)  03/16/2017  0.01   0.00 - 0.11 K/uL  Final    •  NRBC (Absolute)  03/16/2017  0.00     Final    •  Sodium  03/16/2017  135   135 - 145 mmol/L  Final    •  Potassium  03/16/2017  4.0   3.6 - 5.5 mmol/L  Final    •  Chloride  03/16/2017  98   96 - 112 mmol/L  Final    •  Co2  03/16/2017  30   20 - 33 mmol/L  Final    •  Anion Gap  03/16/2017  7.0   0.0 - 11.9  Final    •  Glucose  03/16/2017  99   65 - 99 mg/dL  Final    •  Bun  03/16/2017  9   8 - 22 mg/dL  Final    •  Creatinine  03/16/2017  0.59   0.50 - 1.40 mg/dL  Final    •  Calcium  03/16/2017  9.5   8.5 - 10.5 mg/dL  Final    •  AST(SGOT)  03/16/2017  23   12 - 45 U/L  Final    •  ALT(SGPT)  03/16/2017  21   2 - 50 U/L  Final    •  Alkaline Phosphatase  03/16/2017  57   30 - 99 U/L  Final    •  Total Bilirubin  03/16/2017  0.9   0.1 - 1.5 mg/dL  Final    •  Albumin  03/16/2017  4.2   3.2 - 4.9 g/dL  Final    •  Total Protein  03/16/2017  7.0   6.0 - 8.2 g/dL  Final    •  Globulin  03/16/2017  2.8   1.9 - 3.5 g/dL  Final    •  A-G Ratio  03/16/2017  1.5     Final    •  Cholesterol,Tot  03/16/2017  235*  100 - 199 mg/dL  Final    •  Triglycerides  03/16/2017  87   0 - 149 mg/dL  Final    •  HDL  03/16/2017  90   >=40 mg/dL  Final    •  LDL  03/16/2017  128*  <100 mg/dL  Final    •  GFR If   03/16/2017  >60    >60 mL/min/1.73 m 2  Final    •  GFR If Non   2017  >60   >60 mL/min/1.73 m 2  Final      ]      Imagin. 3/14/17 DEXA: According to the World Health Organization classification, bone mineral density of this patient is normal.      Assessment & Plan:  Right breast invasive ductal carcinoma, 2.2 cm, 0/2 lymph nodes, associated DCIS, Ki-67 50%, ER 80%, MT 80%, HER-2 negative: pT2 pN0: Diagnosed secondary to an abnormal screening mammogram. She underwent lumpectomy and sentinel node biopsy. She completed APBI. She has been started on Arimidex and has been tolerating it well. Her Oncotype DX was 22. I went over the results in detail with the patient. She is an intermediate risk group probably still had a discussion about chemotherapy. I went over option of chemotherapy with TC ×4 cycles. I also went over some of the side effects. After detailed discussion she has opted against active chemotherapy. She is continued on Arimidex.  Bone health: Her bone density scan was normal. She is continued on vitamin D and calcium.  Back her use and alcohol use: Patient continues to smoke vapor cigarettes as well as get alcohol. I again went over the importance of abstaining from alcohol and tobacco. I strongly advised her to stop as soon as possible. Patient states that she will attempt to slowly wean off. We went over various products available to support her but patient refused present. She’s also been referred to [address] [place], [place] [address]now.  Genetics: He was adopted hands unclear in regards to her family history. She does have a half-sister with breast malignancy. She was referred to genetics which is pending.  I will place another referral to genetics.  She is advised to follow up again in 4 weeks or sooner as needed.      I informed the patient that I will be leaving with the nausea. I went over various options for transition of care. After a long discussion, patient would like to be transitioned to either Dr. Rust  or Dr. Barksdale.  Referral will be placed.      She agreed and verbalized her agreement and understanding with the current plan. I answered all questions and concerns she has at this time and advised her to call at any time in the interim with questions or concerns in regards to her care. Thank you for allowing me to participate in her care.      Please note that this dictation was created using voice recognition software. I have made every reasonable attempt to correct obvious errors, but I expect that there are errors of grammar and possibly content that I did not discover before finalizing the note.        Sincerely,  Maxine Palafox  [address] [place] [address], [address]   [phone]

## 2017-03-22 NOTE — MR AVS SNAPSHOT
"Juliane Vega   3/22/2017 1:20 PM   Office Visit   MRN: [id number]    Department:  [facility]   Dept Phone:  [phone]    Description:  Female : 1955   Provider:  Maxine Palafox M.D.           Reason for Visit     Follow-Up 2wk      Allergies as of 3/22/2017     Allergen Noted Reactions    Penicillins 2017   Rash, Itching    [id number]      Vital Signs     Blood Pressure Pulse Temperature Respirations Height Weight    136/84 mmHg 106 36.5 °C (97.7 °F) 18 1.753 m (5' 9\") 102.3 kg (225 lb 8.5 oz)    Body Mass Index Oxygen Saturation Last Menstrual Period Breastfeeding? Smoking Status       33.29 kg/m2 96% (LMP Unknown) No Former Smoker       Basic Information     Date Of Birth Sex Race Ethnicity Preferred Language    1955 Female White, Unknown Non- English      Your appointments     2017  1:20 PM   ONCOLOGY EST PATIENT 30 MIN with Maxine Palafox M.D.   [facility] (--)    [address], [address]  [facility] [id number] [phone]            2017  1:00 PM   Follow Up with Sarah Mcgraw M.D.   [facility] (--)    [address] [facility] [id number] [phone]              Problem List              ICD-10-CM Priority Class Noted - Resolved    Tobacco use disorder F17.200   2011 - Present    Hypertension I10   2011 - Present    Cough R05   2011 - Present    Malignant neoplasm of lower-outer quadrant of right female breast (CMS-HCC) C50.511   2017 - Present      Health Maintenance        Date Due Completion Dates    IMM PNEUMOCOCCAL 19-64 (ADULT) MEDIUM RISK SERIES (1 of 1 - PPSV23) 1975 ---    PAP SMEAR 1977 ---    COLONOSCOPY 2006 ---    IMM ZOSTER VACCINE 2016 ---    IMM INFLUENZA (1) 2016 ---    MAMMOGRAM 3/20/2018 3/20/2017, 2011    IMM DTaP/Tdap/Td Vaccine (2 - Td) 2021            Current Immunizations     Tdap Vaccine 2011      Below and/or attached are " the medications your provider expects you to take. Review all of your home medications and newly ordered medications with your provider and/or pharmacist. Follow medication instructions as directed by your provider and/or pharmacist. Please keep your medication list with you and share with your provider. Update the information when medications are discontinued, doses are changed, or new medications (including over-the-counter products) are added; and carry medication information at all times in the event of emergency situations     Allergies:  PENICILLINS - Rash,Itching               Medications  Valid as of: March 22, 2017 -  1:37 PM    Generic Name Brand Name Tablet Size Instructions for use    Anastrozole (Tab) ARIMIDEX 1 MG Take 1 Tab by mouth every day.        Benazepril-Hydrochlorothiazide (Tab) LOTENSIN HCT 20-25 MG TAKE 1 TAB BY MOUTH EVERY DAY.        Calcium Carbonate-Vitamin D   Take 1 Tab by mouth every day.        Cephalexin (Cap) KEFLEX 250 MG TK 1 C PO TID FOR 10 DAYS        Coenzyme Q10   Take 200 mg by mouth every day.        Flaxseed (Linseed)   Take 1,000 mg by mouth every day.        Multiple Vitamins-Minerals   Take 1 Tab by mouth every day.        Omega-3 Fatty Acids (Cap) fish oil 1000 MG Take 1,000 mg by mouth every day.        OxyCODONE HCl (Tab) ROXICODONE 5 MG TK 1 TO 2 TS PO Q 4 H PRN P        .                 Medicines prescribed today were sent to:     [facility] [id number] [place] [address] - [address] AT [facility] & [address] [address]    Phone: [phone] Fax: [phone]    Open 24 Hours?: No      Medication refill instructions:       If your prescription bottle indicates you have medication refills left, it is not necessary to call your provider’s office. Please contact your pharmacy and they will refill your medication.    If your prescription bottle indicates you do not have any refills left, you may request refills at any time through  one of the following ways: The online [employer] system (except Urgent Care), by calling your provider’s office, or by asking your pharmacy to contact your provider’s office with a refill request. Medication refills are processed only during regular business hours and may not be available until the next business day. Your provider may request additional information or to have a follow-up visit with you prior to refilling your medication.   *Please Note: Medication refills are assigned a new Rx number when refilled electronically. Your pharmacy may indicate that no refills were authorized even though a new prescription for the same medication is available at the pharmacy. Please request the medicine by name with the pharmacy before contacting your provider for a refill.           [employer] Access Code: Activation code not generated  Current Rew[employer] Status: Active          Quit Tobacco Information     Do you want to quit using tobacco?    Quitting tobacco decreases risks of cancer, heart and lung disease, increases life expectancy, improves sense of taste and smell, and increases spending money, among other benefits.    If you are thinking about quitting, we can help.  • [employer] Quit Tobacco Program: 661.553.6477  o Program occurs weekly for four weeks and includes pharmacist consultation on products to support quitting smoking or chewing tobacco. A provider referral is needed for pharmacist consultation.  • Tobacco Users Help Hotline: 3-967-QUIT-NOW (334-0336) or https://[place].quitlogix.org/  o Free, confidential telephone and online coaching for [place] residents. Sessions are designed on a schedule that is convenient for you. Eligible clients receive free nicotine replacement therapy.  • Nationally: www.smokefree.gov  o Information and professional assistance to support both immediate and long-term needs as you become, and remain, a non-smoker. Smokefree.gov allows you to choose the help that best fits your needs.

## 2017-03-22 NOTE — PROGRESS NOTES
Follow Up Note:  Oncology    Date: 3/22/17  Time: 1:20 pm        Referring Physician: Dr. Ramirez Washington  Primary Care:  GREGORY Giron  Surgeon:  Dr. Ramirez Washington   Radiation Oncology:  Dr. Sarah Mcgraw   GYN:     Diagnosis: Right breast invasive ductal carcinoma, 2.2 cm, 0/2 lymph nodes, associated DCIS, Ki-67 50%, ER 80%, CA 80%, HER-2 negative: pT2 pN0    Chief Complaint: She is here for a follow up visit    History of Presenting Illness:  Juliane Vega is a 62 y.o. female diagnosed in 12/2016 with right breast malignancy secondary to abnormal findings on mammogram. Diagnosis she also had no bleeding and underwent D&C with hysteroscopy. Pathology was benign. 12/2016 screening mammogram showed a 1.6 cm mass in the lower outer quadrant of the right breast.  Diagnostic mammogram showed a 2.2 m lesion at 8:00 position.  Biopsy was positive for invasive ductal carcinoma. She then underwent a lumpectomy and so no biopsy with SUNDAY on 2/14/17.  Pathology showed a 2.2 cm invasive ductal carcinoma, grade 3, focal high grade DCIS, clear margins, 0/2 lymph nodes, ER 80%, CA 80%, Ki-67 50% and HER-2 negative. She completed acceleration partial breast radiation.  Started on Arimidex on 3/12/17. An Oncotype DX was ordered with a score of 22.    Interval history:  She is here for a follow-up visit.  He has been feeling fairly well since her last visit. She has started Arimidex on 3/12/17 and has been tolerating it well.  She denies any significant new symptoms since starting Arimidex. She continues to have a mild cough which has been stable. She is still using vapor nicotine but has cut down on the dose. She otherwise has stable energy, appetite and weight. She denies any fevers, chills or night sweats. She denies any nausea, vomiting or abdominal pain.      Past medical history  1. Right breast malignancy  2. Hypertension  3. Hyperlipidemia  4. Staph infection to the scalp as a child          Allergies as  of 03/22/2017 - Zeyad as Reviewed 03/22/2017   Allergen Reaction Noted   • Penicillins Rash and Itching 02/06/2017       Current outpatient prescriptions:   •  anastrozole (ARIMIDEX) 1 MG Tab, Take 1 Tab by mouth every day., Disp: 30 Tab, Rfl: 3  •  Coenzyme Q10 (CO Q 10 PO), Take 200 mg by mouth every day., Disp: , Rfl:   •  Omega-3 Fatty Acids (FISH OIL) 1000 MG Cap capsule, Take 1,000 mg by mouth every day., Disp: , Rfl:   •  Calcium Carbonate-Vitamin D (CALCIUM PLUS VITAMIN D PO), Take 1 Tab by mouth every day., Disp: , Rfl:   •  benazepril-hydrochlorthiazide (LOTENSIN HCT) 20-25 MG per tablet, TAKE 1 TAB BY MOUTH EVERY DAY., Disp: 30 Tab, Rfl: 2  •  Flaxseed, Linseed, (FLAX SEED OIL PO), Take 1,000 mg by mouth every day., Disp: , Rfl:   •  Multiple Vitamins-Minerals (CENTRUM SILVER PO), Take 1 Tab by mouth every day., Disp: , Rfl:   •  cephALEXin (KEFLEX) 250 MG Cap, TK 1 C PO TID FOR 10 DAYS, Disp: , Rfl: 0  •  oxycodone immediate-release (ROXICODONE) 5 MG Tab, TK 1 TO 2 TS PO Q 4 H PRN P, Disp: , Rfl: 0      Review of Systems:  All other review of systems are negative except what was mentioned above in the HPI.  Constitutional: Negative for fever, chills, weight loss and malaise/fatigue.    HENT: Negative for ear pain and neck pain.     Eyes: Negative for blurred vision.    Respiratory: Positive for dry cough. Negative for sputum production and shortness of breath.    Cardiovascular: Negative for chest pain and leg swelling.    Gastrointestinal: Negative for nausea, vomiting and abdominal pain.    Genitourinary: Negative for dysuria.    Musculoskeletal: Negative for myalgias, back pain and joint pain.    Skin: Negative for rash.    Neurological: Negative for dizziness, sensory change, focal weakness and headaches.    Endo/Heme/Allergies: No bruise/bleed easily.    Psychiatric/Behavioral: Negative for depression and memory loss.      Physical Exam:  Filed Vitals:    03/22/17 1253   BP: 136/84   Pulse: 106  "  Temp: 36.5 °C (97.7 °F)   Resp: 18   Height: 1.753 m (5' 9\")   Weight: 102.3 kg (225 lb 8.5 oz)   SpO2: 96%       General: Not in acute distress, alert and oriented x 3  HEENT: normalcephalic, atraumatic, extra ocular muscles intact, moist oral mucus membranes, and oral cavity without any lesions.  Neck: Supple neck and full range of motion  Lymph nodes: No palpable bilateral cervical and supraclavicular lymphadenopathy.    CVS: regular rate and rhythm  RESP: Clear to auscultate bilaterally.  Breast exam: Differed  ABD: Soft, non tender, non distended, positive bowel sounds, and no palpable hepatomegaly   EXT: No edema or cyanosis  CNS: Alert and oriented x3, cranial nerves grossly intact, muscle strength grossly intact, and normal gait.       Labs:    No visits with results within 1 Day(s) from this visit.  Latest known visit with results is:    Hospital Outpatient Visit on 03/16/2017   Component Date Value Ref Range Status   • WBC 03/16/2017 5.3  4.8 - 10.8 K/uL Final   • RBC 03/16/2017 5.01  4.20 - 5.40 M/uL Final   • Hemoglobin 03/16/2017 15.0  12.0 - 16.0 g/dL Final   • Hematocrit 03/16/2017 46.3  37.0 - 47.0 % Final   • MCV 03/16/2017 92.4  81.4 - 97.8 fL Final   • MCH 03/16/2017 29.9  27.0 - 33.0 pg Final   • MCHC 03/16/2017 32.4* 33.6 - 35.0 g/dL Final   • RDW 03/16/2017 41.9  35.9 - 50.0 fL Final   • Platelet Count 03/16/2017 264  164 - 446 K/uL Final   • MPV 03/16/2017 10.0  9.0 - 12.9 fL Final   • Neutrophils-Polys 03/16/2017 64.80  44.00 - 72.00 % Final   • Lymphocytes 03/16/2017 20.70* 22.00 - 41.00 % Final   • Monocytes 03/16/2017 11.80  0.00 - 13.40 % Final   • Eosinophils 03/16/2017 1.70  0.00 - 6.90 % Final   • Basophils 03/16/2017 0.80  0.00 - 1.80 % Final   • Immature Granulocytes 03/16/2017 0.20  0.00 - 0.90 % Final   • Nucleated RBC 03/16/2017 0.00   Final   • Neutrophils (Absolute) 03/16/2017 3.41  2.00 - 7.15 K/uL Final    Includes immature neutrophils, if present.   • Lymphs (Absolute) " 2017 1.09  1.00 - 4.80 K/uL Final   • Monos (Absolute) 2017 0.62  0.00 - 0.85 K/uL Final   • Eos (Absolute) 2017 0.09  0.00 - 0.51 K/uL Final   • Baso (Absolute) 2017 0.04  0.00 - 0.12 K/uL Final   • Immature Granulocytes (abs) 2017 0.01  0.00 - 0.11 K/uL Final   • NRBC (Absolute) 2017 0.00   Final   • Sodium 2017 135  135 - 145 mmol/L Final   • Potassium 2017 4.0  3.6 - 5.5 mmol/L Final   • Chloride 2017 98  96 - 112 mmol/L Final   • Co2 2017 30  20 - 33 mmol/L Final   • Anion Gap 2017 7.0  0.0 - 11.9 Final   • Glucose 2017 99  65 - 99 mg/dL Final   • Bun 2017 9  8 - 22 mg/dL Final   • Creatinine 2017 0.59  0.50 - 1.40 mg/dL Final   • Calcium 2017 9.5  8.5 - 10.5 mg/dL Final   • AST(SGOT) 2017 23  12 - 45 U/L Final   • ALT(SGPT) 2017 21  2 - 50 U/L Final   • Alkaline Phosphatase 2017 57  30 - 99 U/L Final   • Total Bilirubin 2017 0.9  0.1 - 1.5 mg/dL Final   • Albumin 2017 4.2  3.2 - 4.9 g/dL Final   • Total Protein 2017 7.0  6.0 - 8.2 g/dL Final   • Globulin 2017 2.8  1.9 - 3.5 g/dL Final   • A-G Ratio 2017 1.5   Final   • Cholesterol,Tot 2017 235* 100 - 199 mg/dL Final   • Triglycerides 2017 87  0 - 149 mg/dL Final   • HDL 2017 90  >=40 mg/dL Final   • LDL 2017 128* <100 mg/dL Final   • GFR If  2017 >60  >60 mL/min/1.73 m 2 Final   • GFR If Non  2017 >60  >60 mL/min/1.73 m 2 Final   ]    Imagin. 3/14/17 DEXA: According to the World Health Organization classification, bone mineral density of this patient is normal.    Assessment & Plan:  1. Right breast invasive ductal carcinoma, 2.2 cm, 0/2 lymph nodes, associated DCIS, Ki-67 50%, ER 80%, MS 80%, HER-2 negative: pT2 pN0: Diagnosed secondary to an abnormal screening mammogram. She underwent lumpectomy and sentinel node biopsy. She completed APBI. She has  been started on Arimidex and has been tolerating it well. Her Oncotype DX was 22. I went over the results in detail with the patient. She is an intermediate risk group probably still had a discussion about chemotherapy. I went over option of chemotherapy with TC ×4 cycles. I also went over some of the side effects. After detailed discussion she has opted against active chemotherapy. She is continued on Arimidex.  2. Bone health: Her bone density scan was normal. She is continued on vitamin D and calcium.  3. Back her use and alcohol use: Patient continues to smoke vapor cigarettes as well as get alcohol. I again went over the importance of abstaining from alcohol and tobacco. I strongly advised her to stop as soon as possible. Patient states that she will attempt to slowly wean off. We went over various products available to support her but patient refused present. She’s also been referred to [facility].  4. Genetics: He was adopted hands unclear in regards to her family history. She does have a half-sister with breast malignancy. She was referred to genetics which is pending.  I will place another referral to genetics.  5. She is advised to follow up again in 4 weeks or sooner as needed.    I informed the patient that I will be leaving with the nausea. I went over various options for transition of care. After a long discussion, patient would like to be transitioned to either Dr. Rust or Dr. Barksdale.  Referral will be placed.    She agreed and verbalized her agreement and understanding with the current plan. I answered all questions and concerns she has at this time and advised her to call at any time in the interim with questions or concerns in regards to her care. Thank you for allowing me to participate in her care.    Please note that this dictation was created using voice recognition software. I have made every reasonable attempt to correct obvious errors, but I expect that there are errors of grammar and  possibly content that I did not discover before finalizing the note.

## 2017-04-21 ENCOUNTER — APPOINTMENT (OUTPATIENT)
Dept: RADIATION ONCOLOGY | Facility: MEDICAL CENTER | Age: 62
End: 2017-04-21
Attending: RADIOLOGY
Payer: OTHER GOVERNMENT

## 2017-04-21 ENCOUNTER — PATIENT OUTREACH (OUTPATIENT)
Dept: OTHER | Facility: MEDICAL CENTER | Age: 62
End: 2017-04-21

## 2017-04-24 ENCOUNTER — HOSPITAL ENCOUNTER (OUTPATIENT)
Dept: RADIATION ONCOLOGY | Facility: MEDICAL CENTER | Age: 62
End: 2017-04-30
Attending: RADIOLOGY
Payer: OTHER GOVERNMENT

## 2017-04-24 ENCOUNTER — PATIENT OUTREACH (OUTPATIENT)
Dept: OTHER | Facility: MEDICAL CENTER | Age: 62
End: 2017-04-24

## 2017-04-24 VITALS
BODY MASS INDEX: 33.21 KG/M2 | WEIGHT: 225 LBS | SYSTOLIC BLOOD PRESSURE: 148 MMHG | TEMPERATURE: 97.8 F | OXYGEN SATURATION: 100 % | HEART RATE: 101 BPM | DIASTOLIC BLOOD PRESSURE: 77 MMHG

## 2017-04-24 PROCEDURE — 99212 OFFICE O/P EST SF 10 MIN: CPT | Performed by: RADIOLOGY

## 2017-04-24 ASSESSMENT — PAIN SCALES - GENERAL: PAINLEVEL_OUTOF10: 1

## 2017-04-24 NOTE — PROGRESS NOTES
"Met with patient to review breast cancer treatment summary and survivorship care plan.     Late or long term effects noted at this visit: hot flashes    NCCN Survivorship Assessment:   Cardiac:Pt did not receive anthracycline therapy. Pt denied SOB at rest or persistent leg swelling.  Anxiety/depression:pt denied   changes in memory or concentration: pt denied  Fatigue: pt denied  Pain: pt denied  Physical function: pt denied changes. Denied swelling or redness of affected arm. Both hands and forearm noted to be reddened with blanching, pt stated this is normal for her \"I have very rocio skin\". No edema noted.   Sexual function:not addressed   Fertility: not addressed  Sleep disorder:pt denied   Financial barriers: pt denied  Weight changes: pt stated her weight has remained steady, but she would like to lose some.   Discussed importance of healthy diet, exercise, alcohol use moderation.   Discussed vaping cessation, resources including [place] Quitline discussed.     General ASCO breast cancer follow up guidelines reviewed with patient. Patient referred to treating physician for individualized follow up schedule and recommendations / questions.    Scanned to EPIC              "

## 2017-04-24 NOTE — NON-PROVIDER
Patient was seen today in clinic with Dr. Mcgraw for follow up visit for her breast cancer.  Vitals signs and weight were obtained and pain assessment was completed.  Allergies and medications were reviewed with the patient.  Toxicities of treatment assessed.     Vitals/Pain:  Filed Vitals:    04/24/17 1530   BP: 148/77   Pulse: 101   Temp: 36.6 °C (97.8 °F)   Weight: 102.059 kg (225 lb)   SpO2: 100%   PAIN:  1 on a 0-10 pain scale.   Location: Breast at [facility].   Description: Intermittent, Sharp, Shooting        Allergies:   Penicillins    Current Medications:  Current Outpatient Prescriptions   Medication Sig Dispense Refill   • aspirin EC (ECOTRIN) 81 MG Tablet Delayed Response Take 81 mg by mouth every day.     • anastrozole (ARIMIDEX) 1 MG Tab Take 1 Tab by mouth every day. 30 Tab 3   • Coenzyme Q10 (CO Q 10 PO) Take 200 mg by mouth every day.     • Omega-3 Fatty Acids (FISH OIL) 1000 MG Cap capsule Take 1,000 mg by mouth every day.     • Calcium Carbonate-Vitamin D (CALCIUM PLUS VITAMIN D PO) Take 1 Tab by mouth every day.     • benazepril-hydrochlorthiazide (LOTENSIN HCT) 20-25 MG per tablet TAKE 1 TAB BY MOUTH EVERY DAY. 30 Tab 2   • Flaxseed, Linseed, (FLAX SEED OIL PO) Take 1,000 mg by mouth every day.     • Multiple Vitamins-Minerals (CENTRUM SILVER PO) Take 1 Tab by mouth every day.     • oxycodone immediate-release (ROXICODONE) 5 MG Tab TK 1 TO 2 TS PO Q 4 H PRN P  0     No current facility-administered medications for this encounter.        PCP:  Balwinder Hinson R.N.   4/24/2017  3:52 PM

## 2017-04-24 NOTE — PROGRESS NOTES
RADIATION ONCOLOGY FOLLOW-UP    DATE OF SERVICE: 2017    IDENTIFICATION:   A 62 y.o. female with a T2 N0 ER/MO positive HER-2 negative grade 3 invasive ductal carcinoma.  KI-67 50% Oncotype Dx 22    HISTORY OF PRESENT ILLNESS:   Reason is 6 weeks status post Selene brachytherapy to the right breast. She's doing well essentially without any major complaints she has a little bit of soreness at the entrance site but otherwise is doing quite well. She started  on arimidex and is tolerating it without major effect.     CURRENT MEDICATIONS:  Current Outpatient Prescriptions   Medication Sig Dispense Refill   • aspirin EC (ECOTRIN) 81 MG Tablet Delayed Response Take 81 mg by mouth every day.     • anastrozole (ARIMIDEX) 1 MG Tab Take 1 Tab by mouth every day. 30 Tab 3   • Coenzyme Q10 (CO Q 10 PO) Take 200 mg by mouth every day.     • Omega-3 Fatty Acids (FISH OIL) 1000 MG Cap capsule Take 1,000 mg by mouth every day.     • Calcium Carbonate-Vitamin D (CALCIUM PLUS VITAMIN D PO) Take 1 Tab by mouth every day.     • benazepril-hydrochlorthiazide (LOTENSIN HCT) 20-25 MG per tablet TAKE 1 TAB BY MOUTH EVERY DAY. 30 Tab 2   • Flaxseed, Linseed, (FLAX SEED OIL PO) Take 1,000 mg by mouth every day.     • Multiple Vitamins-Minerals (CENTRUM SILVER PO) Take 1 Tab by mouth every day.     • oxycodone immediate-release (ROXICODONE) 5 MG Tab TK 1 TO 2 TS PO Q 4 H PRN P  0     No current facility-administered medications for this encounter.       ALLERGIES:  Penicillins    FAMILY HISTORY:    Family History   Problem Relation Age of Onset   • Heart Disease Father 51         • Cancer Sister           SOCIAL HISTORY:     reports that she quit smoking about 4 years ago. Her smoking use included Cigarettes. She has a 35 pack-year smoking history. She uses smokeless tobacco. She reports that she drinks alcohol. She reports that she does not use illicit drugs. Pt lives in [place], [place] with her .  She is retired and worked as an  .      REVIEW OF SYSTEMS: Pertinent positives consist of pain at the incision site    PHYSICAL EXAM:    Filed Vitals:    04/24/17 1530   BP: 148/77   Pulse: 101   Temp: 36.6 °C (97.8 °F)   Weight: 102.059 kg (225 lb)   SpO2: 100%   PAIN:  1 on a 0-10 scale.   Location: Breast at [facility] site.   Description: Intermittent, Sharp, Shooting       GENERAL: Well-appearing alert and oriented ×3 in no apparent distress  Breasts: Are bilaterally symmetrical and pendulous the right breast shows evidence of the lumpectomy site and an axillary scar along with the Selene incision site. There is induration associated with all the scars but they are nontender. No masses are appreciated in either breast or axilla  HEENT:  Pupils are equal, round, and reactive to light.  Extraocular muscles   are intact. Sclerae nonicteric.  Conjunctivae pink.  Oral cavity, tongue   protrudes midline.   NECK:  Supple without evidence of thyromegaly.  NODES:  No peripheral adenopathy of the neck, supraclavicular fossa or axillae   bilaterally.  LUNGS:  Clear to ascultation and resonant to percussion.  HEART:  Regular rate and rhythm.  No murmur appreciated  ABDOMEN:  Soft. No evidence of hepatosplenomegaly.  Positive bowel sounds.  EXTREMITIES:  Without Edema.  NEUROLOGIC:  Cranial nerves II through XII were intact.  Strength is 5/5 in   lower extremities bilaterally.  DTRs were symmetrical.  There was no focal   sensory deficit appreciated.    ECOG PERFORMANCE STATUS:  0= Fully active, able to carry on all pre-disease performance without restriction.    LABORATORY DATA:   Lab Results   Component Value Date/Time    SODIUM 135 03/16/2017 09:40 AM    POTASSIUM 4.0 03/16/2017 09:40 AM    CHLORIDE 98 03/16/2017 09:40 AM    CO2 30 03/16/2017 09:40 AM    GLUCOSE 99 03/16/2017 09:40 AM    BUN 9 03/16/2017 09:40 AM    CREATININE 0.59 03/16/2017 09:40 AM    BUN-CREATININE RATIO 11 09/20/2011 08:00 AM     Lab Results   Component Value  Date/Time    ALKALINE PHOSPHATASE 57 03/16/2017 09:40 AM    AST(SGOT) 23 03/16/2017 09:40 AM    ALT(SGPT) 21 03/16/2017 09:40 AM    TOTAL BILIRUBIN 0.9 03/16/2017 09:40 AM      Lab Results   Component Value Date/Time    WBC 5.3 03/16/2017 09:40 AM    RBC 5.01 03/16/2017 09:40 AM    HEMOGLOBIN 15.0 03/16/2017 09:40 AM    HEMATOCRIT 46.3 03/16/2017 09:40 AM    MCV 92.4 03/16/2017 09:40 AM    MCH 29.9 03/16/2017 09:40 AM    MCHC 32.4* 03/16/2017 09:40 AM    MPV 10.0 03/16/2017 09:40 AM    NEUTROPHILS-POLYS 64.80 03/16/2017 09:40 AM    LYMPHOCYTES 20.70* 03/16/2017 09:40 AM    MONOCYTES 11.80 03/16/2017 09:40 AM    EOSINOPHILS 1.70 03/16/2017 09:40 AM    BASOPHILS 0.80 03/16/2017 09:40 AM          IMPRESSION:    A 62 y.o. with T2 N0 grade 3 year year positive HER-2 negative breast cancer on arimidex status post Selene brachytherapy.    RECOMMENDATIONS:   She will be seeing Dr. garza to inform aunts Dr. Ortiz in 6-7 months with bilateral mammograms at that time. I will see her back in a years time or sooner as needed      Thank you for the opportunity to participate in her care.  If any questions or comments, please do not hesitate in calling.

## 2019-12-13 ENCOUNTER — HOSPITAL ENCOUNTER (OUTPATIENT)
Dept: RADIOLOGY | Facility: MEDICAL CENTER | Age: 64
End: 2019-12-13
Attending: PHYSICIAN ASSISTANT
Payer: OTHER GOVERNMENT

## 2019-12-13 DIAGNOSIS — M25.512 LEFT SHOULDER PAIN, UNSPECIFIED CHRONICITY: ICD-10-CM

## 2019-12-13 PROCEDURE — 73030 X-RAY EXAM OF SHOULDER: CPT | Mod: LT

## 2021-03-03 DIAGNOSIS — Z23 NEED FOR VACCINATION: ICD-10-CM

## 2024-07-25 ENCOUNTER — APPOINTMENT (RX ONLY)
Dept: URBAN - METROPOLITAN AREA CLINIC 4 | Facility: CLINIC | Age: 69
Setting detail: DERMATOLOGY
End: 2024-07-25

## 2024-07-25 DIAGNOSIS — D22 MELANOCYTIC NEVI: ICD-10-CM

## 2024-07-25 DIAGNOSIS — Z71.89 OTHER SPECIFIED COUNSELING: ICD-10-CM

## 2024-07-25 DIAGNOSIS — L72.0 EPIDERMAL CYST: ICD-10-CM

## 2024-07-25 DIAGNOSIS — L81.3 CAFÉ AU LAIT SPOTS: ICD-10-CM

## 2024-07-25 DIAGNOSIS — L71.8 OTHER ROSACEA: ICD-10-CM

## 2024-07-25 DIAGNOSIS — D18.0 HEMANGIOMA: ICD-10-CM

## 2024-07-25 PROBLEM — D22.61 MELANOCYTIC NEVI OF RIGHT UPPER LIMB, INCLUDING SHOULDER: Status: ACTIVE | Noted: 2024-07-25

## 2024-07-25 PROBLEM — D22.5 MELANOCYTIC NEVI OF TRUNK: Status: ACTIVE | Noted: 2024-07-25

## 2024-07-25 PROBLEM — D18.01 HEMANGIOMA OF SKIN AND SUBCUTANEOUS TISSUE: Status: ACTIVE | Noted: 2024-07-25

## 2024-07-25 PROCEDURE — ? DIAGNOSIS COMMENT

## 2024-07-25 PROCEDURE — 99203 OFFICE O/P NEW LOW 30 MIN: CPT | Mod: 25

## 2024-07-25 PROCEDURE — 11102 TANGNTL BX SKIN SINGLE LES: CPT

## 2024-07-25 PROCEDURE — ? PHOTO-DOCUMENTATION

## 2024-07-25 PROCEDURE — ? COUNSELING

## 2024-07-25 PROCEDURE — ? BIOPSY BY SHAVE METHOD

## 2024-07-25 ASSESSMENT — LOCATION DETAILED DESCRIPTION DERM
LOCATION DETAILED: RIGHT MID-UPPER BACK
LOCATION DETAILED: RIGHT CENTRAL MALAR CHEEK
LOCATION DETAILED: RIGHT RADIAL DORSAL HAND
LOCATION DETAILED: RIGHT SUPERIOR MEDIAL UPPER BACK
LOCATION DETAILED: RIGHT INFERIOR MEDIAL LOWER BACK
LOCATION DETAILED: LEFT MEDIAL BREAST 7-8:00 REGION
LOCATION DETAILED: UPPER STERNUM
LOCATION DETAILED: RIGHT SUPERIOR MEDIAL LOWER BACK
LOCATION DETAILED: LEFT ANTERIOR PROXIMAL THIGH
LOCATION DETAILED: LEFT INFERIOR MEDIAL MALAR CHEEK
LOCATION DETAILED: SUBXIPHOID

## 2024-07-25 ASSESSMENT — LOCATION SIMPLE DESCRIPTION DERM
LOCATION SIMPLE: RIGHT CHEEK
LOCATION SIMPLE: LEFT CHEEK
LOCATION SIMPLE: LEFT THIGH
LOCATION SIMPLE: LEFT BREAST
LOCATION SIMPLE: RIGHT HAND
LOCATION SIMPLE: ABDOMEN
LOCATION SIMPLE: CHEST
LOCATION SIMPLE: RIGHT LOWER BACK
LOCATION SIMPLE: RIGHT UPPER BACK

## 2024-07-25 ASSESSMENT — LOCATION ZONE DERM
LOCATION ZONE: TRUNK
LOCATION ZONE: HAND
LOCATION ZONE: FACE
LOCATION ZONE: LEG

## 2024-07-25 NOTE — PROCEDURE: DIAGNOSIS COMMENT
Comment: Will send to Dr. Huff at [facility] for evaluation and management
Detail Level: Detailed
Render Risk Assessment In Note?: no

## 2024-10-09 ENCOUNTER — APPOINTMENT (RX ONLY)
Dept: URBAN - METROPOLITAN AREA CLINIC 20 | Facility: CLINIC | Age: 69
Setting detail: DERMATOLOGY
End: 2024-10-09

## 2024-10-09 DIAGNOSIS — Z41.9 ENCOUNTER FOR PROCEDURE FOR PURPOSES OTHER THAN REMEDYING HEALTH STATE, UNSPECIFIED: ICD-10-CM

## 2024-10-09 PROCEDURE — ? COSMETIC CONSULTATION - PULSED-DYE LASER

## 2024-10-09 NOTE — HPI: COSMETIC CONSULTATION
When Outside In The Sun, Do You...: mostly burns, rarely tans
Additional History: Pt has broken blood vessels around nose.

## 2024-10-18 PROBLEM — M17.12 DEGENERATIVE ARTHRITIS OF LEFT KNEE: Status: ACTIVE | Noted: 2024-10-18

## 2024-10-30 ENCOUNTER — APPOINTMENT (RX ONLY)
Dept: URBAN - METROPOLITAN AREA CLINIC 20 | Facility: CLINIC | Age: 69
Setting detail: DERMATOLOGY
End: 2024-10-30

## 2024-10-30 DIAGNOSIS — Z41.9 ENCOUNTER FOR PROCEDURE FOR PURPOSES OTHER THAN REMEDYING HEALTH STATE, UNSPECIFIED: ICD-10-CM

## 2024-10-30 PROCEDURE — ? PULSED-DYE LASER

## 2024-10-30 ASSESSMENT — LOCATION SIMPLE DESCRIPTION DERM: LOCATION SIMPLE: NOSE

## 2024-10-30 ASSESSMENT — LOCATION DETAILED DESCRIPTION DERM: LOCATION DETAILED: NASAL SUPRATIP

## 2024-10-30 ASSESSMENT — LOCATION ZONE DERM: LOCATION ZONE: NOSE

## 2024-10-30 NOTE — PROCEDURE: PULSED-DYE LASER
Post-Procedure Care: Vaseline and ice applied. Post care reviewed with patient.
Spot Size: 7 mm
Fluence In J/Cm2 (Optional): 11.75
Delay Time (Ms): 20
Cryogen Time (Ms): 30
Pulse Duration: 40 ms
Location (Required For Details To Render In Note But Body Touch Will Also Count For First Location): nose
Laser Type: Vbeam 595nm
Pulse Duration: 10 ms
Consent: Written consent obtained, risks reviewed including but not limited to crusting, scabbing, blistering, scarring, darker or lighter pigmentary change, incidental hair removal, bruising, and/or incomplete removal.
Fluence In J/Cm2 (Optional): 7
Pulse Duration: 20 ms
Detail Level: Zone
Post-Care Instructions: I reviewed with the patient in detail post-care instructions. Patient should stay away from the sun and wear sun protection until treated areas are fully healed.

## 2024-11-20 ENCOUNTER — HOSPITAL ENCOUNTER (OUTPATIENT)
Dept: RADIOLOGY | Facility: MEDICAL CENTER | Age: 69
End: 2024-11-20
Attending: ORTHOPAEDIC SURGERY
Payer: MEDICARE

## 2024-11-20 DIAGNOSIS — M17.12 PRIMARY OSTEOARTHRITIS OF LEFT KNEE: ICD-10-CM

## 2024-11-20 PROCEDURE — 73700 CT LOWER EXTREMITY W/O DYE: CPT | Mod: LT

## 2025-02-18 ENCOUNTER — HOSPITAL ENCOUNTER (OUTPATIENT)
Dept: RADIOLOGY | Facility: MEDICAL CENTER | Age: 70
End: 2025-02-18
Attending: ORTHOPAEDIC SURGERY
Payer: MEDICARE

## 2025-02-18 DIAGNOSIS — M79.89 MASS OF SOFT TISSUE OF RIGHT LOWER EXTREMITY: ICD-10-CM

## 2025-02-18 PROCEDURE — 73720 MRI LWR EXTREMITY W/O&W/DYE: CPT | Mod: RT

## 2025-02-18 PROCEDURE — 700117 HCHG RX CONTRAST REV CODE 255: Mod: JZ | Performed by: ORTHOPAEDIC SURGERY

## 2025-02-18 PROCEDURE — A9579 GAD-BASE MR CONTRAST NOS,1ML: HCPCS | Mod: JZ | Performed by: ORTHOPAEDIC SURGERY

## 2025-02-18 RX ADMIN — GADOTERIDOL 20 ML: 279.3 INJECTION, SOLUTION INTRAVENOUS at 12:40

## 2025-03-11 ENCOUNTER — APPOINTMENT (OUTPATIENT)
Dept: URBAN - METROPOLITAN AREA CLINIC 20 | Facility: CLINIC | Age: 70
Setting detail: DERMATOLOGY
End: 2025-03-11

## 2025-03-11 DIAGNOSIS — Z41.9 ENCOUNTER FOR PROCEDURE FOR PURPOSES OTHER THAN REMEDYING HEALTH STATE, UNSPECIFIED: ICD-10-CM

## 2025-03-11 PROCEDURE — ? PULSED-DYE LASER

## 2025-03-11 PROCEDURE — ? ADDITIONAL NOTES

## 2025-03-11 ASSESSMENT — LOCATION DETAILED DESCRIPTION DERM: LOCATION DETAILED: NASAL SUPRATIP

## 2025-03-11 ASSESSMENT — LOCATION ZONE DERM: LOCATION ZONE: NOSE

## 2025-03-11 ASSESSMENT — LOCATION SIMPLE DESCRIPTION DERM: LOCATION SIMPLE: NOSE

## 2025-03-11 NOTE — PROCEDURE: ADDITIONAL NOTES
Detail Level: Simple
Render Risk Assessment In Note?: no
Additional Notes: I will try long pulsed yag on large nasal vessel if it doesn’t go away

## 2025-05-13 ENCOUNTER — APPOINTMENT (OUTPATIENT)
Dept: URBAN - METROPOLITAN AREA CLINIC 20 | Facility: CLINIC | Age: 70
Setting detail: DERMATOLOGY
End: 2025-05-13

## 2025-05-13 DIAGNOSIS — Z41.9 ENCOUNTER FOR PROCEDURE FOR PURPOSES OTHER THAN REMEDYING HEALTH STATE, UNSPECIFIED: ICD-10-CM

## 2025-05-13 PROCEDURE — ? EVO ND:YAG 1064NM

## 2025-05-13 PROCEDURE — ? PULSED-DYE LASER

## 2025-05-13 ASSESSMENT — LOCATION DETAILED DESCRIPTION DERM: LOCATION DETAILED: NASAL SUPRATIP

## 2025-05-13 ASSESSMENT — LOCATION ZONE DERM: LOCATION ZONE: NOSE

## 2025-05-13 ASSESSMENT — LOCATION SIMPLE DESCRIPTION DERM: LOCATION SIMPLE: NOSE

## 2025-05-13 NOTE — PROCEDURE: EVO ND:YAG [id number]
Location Override: nose
Add Another Setting?: no
Post-Care Instructions: I reviewed with the patient in detail post-care instructions. Patient should avoid sun exposure before and after treatment.  Diligent sunscreen use and sun avoidance advised.
Number Of Prepaid Treatments (Will Not Render If 0): 0
Spot Size: 3mm
Detail Level: Simple
Pulse Duration (Ms): 10
Fluence (J/Cm2): 11
Consent: Written consent obtained.  Risks reviewed including but not limited to erythema, swelling, crusting, scabbing, blistering, scarring, and darker or lighter pigmentary change.  Patient understands that results are not guaranteed and multiple treatments may be needed to achieve desired result.
Anesthesia Type: 1% lidocaine with epinephrine
Procedure Note: Treatment was administered using the setting parameters listed above.

## (undated) DEVICE — SODIUM CHL IRRIGATION 0.9% 1000ML (12EA/CA)

## (undated) DEVICE — SLEEVE, VASO, THIGH, MED

## (undated) DEVICE — GLOVE BIOGEL SZ 8 SURGICAL PF LTX - (50PR/BX 4BX/CA)

## (undated) DEVICE — TUBE CONNECT SUCTION CLEAR 120 X 1/4" (50EA/CA)"

## (undated) DEVICE — STERI STRIP COMPOUND BENZOIN - TINCTURE 0.6ML WITH APPLICATOR (40EA/BX)

## (undated) DEVICE — GLOVE BIOGEL SZ 7.5 SURGICAL PF LTX - (50PR/BX 4BX/CA)

## (undated) DEVICE — SUTURE 4-0 MONOCRYL PLUSPC-3 - 18 INCH (12/BX)

## (undated) DEVICE — CONTAINER, SPECIMEN, STERILE

## (undated) DEVICE — PACK MAJOR BASIN - (2EA/CA)

## (undated) DEVICE — HEAD HOLDER JUNIOR/ADULT

## (undated) DEVICE — PROTECTOR ULNA NERVE - (36PR/CA)

## (undated) DEVICE — GOWN WARMING STANDARD FLEX - (30/CA)

## (undated) DEVICE — COVER PROBE STERILE CONE (12EA/CA)

## (undated) DEVICE — CHLORAPREP 26 ML APPLICATOR - ORANGE TINT(25/CA)

## (undated) DEVICE — KIT ROOM DECONTAMINATION

## (undated) DEVICE — GLOVE BIOGEL PI ORTHO SZ 8 PF LF (40PR/BX)

## (undated) DEVICE — SHEAR HS FOCUS 9CM CVD - (6/BX)

## (undated) DEVICE — DRAPE CHEST/BREAST - (12EA/CA)

## (undated) DEVICE — ELECTRODE 850 FOAM ADHESIVE - HYDROGEL RADIOTRNSPRNT (50/PK)

## (undated) DEVICE — CANISTER SUCTION 3000ML MECHANICAL FILTER AUTO SHUTOFF MEDI-VAC NONSTERILE LF DISP  (40EA/CA)

## (undated) DEVICE — GOWN SURGICAL X-LARGE ULTRA - FILM-REINFORCED (20/CA)

## (undated) DEVICE — CLOSURE SKIN STRIP 1/2 X 4 IN - (STERI STRIP) (50/BX 4BX/CA)

## (undated) DEVICE — GLOVE BIOGEL PI ORTHO SZ 7.5 PF LF (40PR/BX)

## (undated) DEVICE — TUBING CLEARLINK DUO-VENT - C-FLO (48EA/CA)

## (undated) DEVICE — SET EXTENSION WITH 2 PORTS (48EA/CA) ***PART #2C8610 IS A SUBSTITUTE*****

## (undated) DEVICE — EVICEL 5ML - (1/BX)REFRIGERATE UPON RECEIPT

## (undated) DEVICE — MASK ANESTHESIA ADULT  - (100/CA)

## (undated) DEVICE — LEAD SET 6 DISP. EKG NIHON KOHDEN

## (undated) DEVICE — LACTATED RINGERS INJ 1000 ML - (14EA/CA 60CA/PF)

## (undated) DEVICE — NEEDLE NON SAFETY HYPO 22 GA X 1 1/2 IN (100/BX)

## (undated) DEVICE — NEPTUNE 4 PORT MANIFOLD - (20/PK)

## (undated) DEVICE — SUTURE 3-0 VICRYL PLUS SH - 8X 18 INCH (12/BX)

## (undated) DEVICE — SET LEADWIRE 5 LEAD BEDSIDE DISPOSABLE ECG (1SET OF 5/EA)

## (undated) DEVICE — DRESSING NON ADHERENT 3 X 4 - STERILE (100/BX 12BX/CA)

## (undated) DEVICE — SUTURE 4-0 MONOCRYL PLUS PS-2 - 27 INCH (36/BX)

## (undated) DEVICE — ELECTRODE DUAL RETURN W/ CORD - (50/PK)

## (undated) DEVICE — BLADE SURGICAL #15 - (50/BX 3BX/CA)

## (undated) DEVICE — SUCTION INSTRUMENT YANKAUER BULBOUS TIP W/O VENT (50EA/CA)

## (undated) DEVICE — DRESSING TRANSPARENT FILM TEGADERM 4 X 4.75" (50EA/BX)"

## (undated) DEVICE — SPONGE GAUZESTER 4 X 4 4PLY - (128PK/CA)

## (undated) DEVICE — SUTURE GENERAL

## (undated) DEVICE — STAPLER SKIN DISP - (6/BX 10BX/CA) VISISTAT

## (undated) DEVICE — GOWN SURGEONS X-LARGE - DISP. (30/CA)

## (undated) DEVICE — SYRINGE 10 ML CONTROL LL (25EA/BX 4BX/CA)

## (undated) DEVICE — BLADE SURGICAL #10 - (50/BX)

## (undated) DEVICE — MASK, LARYNGEAL AIRWAY #4

## (undated) DEVICE — KIT ANESTHESIA W/CIRCUIT & 3/LT BAG W/FILTER (20EA/CA)

## (undated) DEVICE — SENSOR SPO2 NEO LNCS ADHESIVE (20/BX) SEE USER NOTES

## (undated) DEVICE — SYRINGE SAFETY 10 ML 18 GA X 1 1/2 BLUNT LL (100/BX 4BX/CA)